# Patient Record
Sex: FEMALE | Race: WHITE | NOT HISPANIC OR LATINO | Employment: FULL TIME | URBAN - METROPOLITAN AREA
[De-identification: names, ages, dates, MRNs, and addresses within clinical notes are randomized per-mention and may not be internally consistent; named-entity substitution may affect disease eponyms.]

---

## 2021-12-16 ENCOUNTER — APPOINTMENT (OUTPATIENT)
Dept: RADIOLOGY | Facility: IMAGING CENTER | Age: 56
End: 2021-12-16
Attending: NURSE PRACTITIONER
Payer: COMMERCIAL

## 2021-12-16 ENCOUNTER — OFFICE VISIT (OUTPATIENT)
Dept: URGENT CARE | Facility: PHYSICIAN GROUP | Age: 56
End: 2021-12-16
Payer: COMMERCIAL

## 2021-12-16 VITALS
SYSTOLIC BLOOD PRESSURE: 124 MMHG | HEART RATE: 77 BPM | DIASTOLIC BLOOD PRESSURE: 82 MMHG | RESPIRATION RATE: 16 BRPM | WEIGHT: 177 LBS | TEMPERATURE: 96.9 F | OXYGEN SATURATION: 97 %
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
## 2021-12-16 DIAGNOSIS — S59.902A INJURY OF LEFT ELBOW, INITIAL ENCOUNTER: ICD-10-CM

## 2021-12-16 DIAGNOSIS — S50.02XA CONTUSION OF LEFT ELBOW, INITIAL ENCOUNTER: ICD-10-CM

## 2021-12-16 PROCEDURE — 73080 X-RAY EXAM OF ELBOW: CPT | Mod: TC,FY,LT | Performed by: RADIOLOGY

## 2021-12-16 PROCEDURE — 99203 OFFICE O/P NEW LOW 30 MIN: CPT | Performed by: NURSE PRACTITIONER

## 2021-12-16 RX ORDER — FENOFIBRATE 150 MG/1
CAPSULE ORAL
COMMUNITY
Start: 2021-11-06

## 2021-12-16 RX ORDER — LISINOPRIL AND HYDROCHLOROTHIAZIDE 12.5; 1 MG/1; MG/1
TABLET ORAL
COMMUNITY
Start: 2021-11-06 | End: 2023-09-11 | Stop reason: SDUPTHER

## 2021-12-16 ASSESSMENT — ENCOUNTER SYMPTOMS
MYALGIAS: 1
TINGLING: 0
SENSORY CHANGE: 0
FOCAL WEAKNESS: 0
CHILLS: 0

## 2021-12-16 NOTE — LETTER
December 16, 2021        Montserrat Harris  [address] [facility] [place] [address]        Montserrat was seen in our clinic today and she is excused from work. Thank you.  If you have any questions or concerns, please don't hesitate to call.        Sincerely,        YRIS Rushing.    Electronically Signed

## 2021-12-16 NOTE — PROGRESS NOTES
Subjective     Montserrat Harris is a 56 y.o. female who presents with Fall (Injured L elbow and forearm, this morning in driveway )            HPI   New problem.  Patient is a 56-year-old female who presents with a fall in her driveway this morning and subsequent left elbow/forearm pain.  She denies any wrist pain or shoulder pain with this.  She denies distal paresthesia.  She is right-hand dominant.  She has been icing but no medications taken so far.  Patient has no known allergies.  Current Outpatient Medications on File Prior to Visit   Medication Sig Dispense Refill   • lisinopril-hydrochlorothiazide (PRINZIDE) 10-12.5 MG per tablet      • metFORMIN (GLUCOPHAGE) 500 MG Tab      • Fenofibrate 150 MG Cap        No current facility-administered medications on file prior to visit.     Social History     Socioeconomic History   • Marital status:      Spouse name: Not on file   • Number of children: Not on file   • Years of education: Not on file   • Highest education level: Not on file   Occupational History   • Not on file   Tobacco Use   • Smoking status: Not on file   • Smokeless tobacco: Not on file   Substance and Sexual Activity   • Alcohol use: Not on file   • Drug use: Not on file   • Sexual activity: Not on file   Other Topics Concern   • Not on file   Social History Narrative   • Not on file     Social Determinants of Health     Financial Resource Strain:    • Difficulty of Paying Living Expenses: Not on file   Food Insecurity:    • Worried About Running Out of Food in the Last Year: Not on file   • Ran Out of Food in the Last Year: Not on file   Transportation Needs:    • Lack of Transportation (Medical): Not on file   • Lack of Transportation (Non-Medical): Not on file   Physical Activity:    • Days of Exercise per Week: Not on file   • Minutes of Exercise per Session: Not on file   Stress:    • Feeling of Stress : Not on file   Social Connections:    • Frequency of Communication with Friends and  Family: Not on file   • Frequency of Social Gatherings with Friends and Family: Not on file   • Attends [employer] Services: Not on file   • Active Member of Clubs or Organizations: Not on file   • Attends Club or Organization Meetings: Not on file   • Marital Status: Not on file   Intimate Partner Violence:    • Fear of Current or Ex-Partner: Not on file   • Emotionally Abused: Not on file   • Physically Abused: Not on file   • Sexually Abused: Not on file   Housing Stability:    • Unable to Pay for Housing in the Last Year: Not on file   • Number of Places Lived in the Last Year: Not on file   • Unstable Housing in the Last Year: Not on file     Breast Cancer-related family history is not on file.      Review of Systems   Constitutional: Negative for chills.   Musculoskeletal: Positive for joint pain and myalgias.   Neurological: Negative for tingling, sensory change and focal weakness.              Objective     /82   Pulse 77   Temp 36.1 °C (96.9 °F) (Temporal)   Resp 16   Wt 80.3 kg (177 lb)   SpO2 97%      Physical Exam  Constitutional:       Appearance: Normal appearance. She is not ill-appearing.   Cardiovascular:      Rate and Rhythm: Normal rate and regular rhythm.      Heart sounds: No murmur heard.      Pulmonary:      Effort: Pulmonary effort is normal.      Breath sounds: Normal breath sounds.   Musculoskeletal:      Comments: Full albeit painful range of motion of left elbow.  No swelling appreciated on exam.  No bruising   Skin:     General: Skin is warm and dry.      Findings: No bruising or erythema.   Neurological:      General: No focal deficit present.      Mental Status: She is alert and oriented to person, place, and time.   Psychiatric:         Mood and Affect: Mood normal.         Behavior: Behavior normal.                             Assessment & Plan        1. Contusion of left elbow, initial encounter     2. Injury of left elbow, initial encounter  DX-ELBOW-COMPLETE 3+ LEFT   3.  Fall due to slipping on ice or snow, initial encounter       Negative imaging on left elbow.  Reviewed icing/nsaids with patient.  Activity as tolerated. Work note for today.  Differential diagnosis, natural history, supportive care, and indications for immediate follow-up discussed at length.

## 2022-11-22 ENCOUNTER — TELEPHONE (OUTPATIENT)
Dept: HEALTH INFORMATION MANAGEMENT | Facility: OTHER | Age: 57
End: 2022-11-22

## 2022-11-22 ENCOUNTER — OFFICE VISIT (OUTPATIENT)
Dept: URGENT CARE | Facility: PHYSICIAN GROUP | Age: 57
End: 2022-11-22
Payer: COMMERCIAL

## 2022-11-22 VITALS
HEIGHT: 63 IN | DIASTOLIC BLOOD PRESSURE: 60 MMHG | SYSTOLIC BLOOD PRESSURE: 112 MMHG | OXYGEN SATURATION: 95 % | HEART RATE: 102 BPM | TEMPERATURE: 98 F | RESPIRATION RATE: 18 BRPM | BODY MASS INDEX: 33.81 KG/M2 | WEIGHT: 190.8 LBS

## 2022-11-22 DIAGNOSIS — J40 BRONCHITIS WITH WHEEZING: ICD-10-CM

## 2022-11-22 PROCEDURE — 99213 OFFICE O/P EST LOW 20 MIN: CPT | Performed by: NURSE PRACTITIONER

## 2022-11-22 RX ORDER — ALBUTEROL SULFATE 90 UG/1
2 AEROSOL, METERED RESPIRATORY (INHALATION) EVERY 6 HOURS PRN
Qty: 8.5 G | Refills: 0 | Status: SHIPPED | OUTPATIENT
Start: 2022-11-22 | End: 2023-05-22 | Stop reason: SDUPTHER

## 2022-11-22 RX ORDER — PREDNISONE 20 MG/1
40 TABLET ORAL DAILY
Qty: 6 TABLET | Refills: 0 | Status: SHIPPED | OUTPATIENT
Start: 2022-11-22 | End: 2022-11-25

## 2022-11-22 NOTE — PROGRESS NOTES
Patient has consented to treatment and for use of patient information for treatment and billing purposes.    Date: 11/22/22     Arrival Mode: Private Vehicle / Ambulatory    Chief Complaint:    Chief Complaint   Patient presents with    Cough     Green and yellow mucous      Patient is deaf and her  is interpreting for her.    History of Present Illness: 57 y.o. female  presents to clinic with 2-day history of cough with posttussive vomiting due to mucus.  Body aches and fatigue.  Presents to clinic with  who has similar symptoms.   states he was tested for influenza and COVID of which both were negative.  Patient did have a coughing fit this morning which did cause her to become quite anxious.   states he did give her some of his inhaler which did help her.  Patient reports wheezing at night.  No history of asthma.  Has used the albuterol inhaler once and a Flonase nasal spray which both were helpful.      ROS:  As stated in HPI     Pertinent Medical History:    No past medical history on file.     Pertinent Surgical History:    No past surgical history on file.     Current  Medications:  Current Outpatient Medications on File Prior to Visit   Medication Sig Dispense Refill    lisinopril-hydrochlorothiazide (PRINZIDE) 10-12.5 MG per tablet       metFORMIN (GLUCOPHAGE) 500 MG Tab       Fenofibrate 150 MG Cap        No current facility-administered medications on file prior to visit.        Allergies:     Patient has no known allergies.     Social History:   Social History     Socioeconomic History    Marital status:      Spouse name: Not on file    Number of children: Not on file    Years of education: Not on file    Highest education level: Not on file   Occupational History    Not on file   Tobacco Use    Smoking status: Not on file    Smokeless tobacco: Not on file   Substance and Sexual Activity    Alcohol use: Not on file    Drug use: Not on file    Sexual activity: Not on  file   Other Topics Concern    Not on file   Social History Narrative    Not on file     Social Determinants of Health     Financial Resource Strain: Not on file   Food Insecurity: Not on file   Transportation Needs: Not on file   Physical Activity: Not on file   Stress: Not on file   Social Connections: Not on file   Intimate Partner Violence: Not on file   Housing Stability: Not on file        No LMP recorded.       Physical Exam:  Vitals:    11/22/22 1013   BP: 112/60   Pulse: (!) 102   Resp: 18   Temp: 36.7 °C (98 °F)   SpO2: 95%        Physical Exam  Vitals reviewed.   Constitutional:       General: She is not in acute distress.     Appearance: Normal appearance. She is well-developed. She is not toxic-appearing.   HENT:      Head: Normocephalic and atraumatic.      Right Ear: Tympanic membrane, ear canal and external ear normal.      Left Ear: Tympanic membrane, ear canal and external ear normal.      Nose: Congestion and rhinorrhea present.      Mouth/Throat:      Lips: Pink.      Mouth: Mucous membranes are moist.      Pharynx: Posterior oropharyngeal erythema present.   Eyes:      General: Lids are normal. Gaze aligned appropriately. No allergic shiner or scleral icterus.     Extraocular Movements: Extraocular movements intact.      Conjunctiva/sclera: Conjunctivae normal.      Pupils: Pupils are equal, round, and reactive to light.   Cardiovascular:      Rate and Rhythm: Normal rate and regular rhythm.      Pulses:           Radial pulses are 2+ on the right side and 2+ on the left side.      Heart sounds: Normal heart sounds.   Pulmonary:      Effort: Pulmonary effort is normal.      Breath sounds: Normal breath sounds. No wheezing.   Musculoskeletal:      Right lower leg: No edema.      Left lower leg: No edema.   Lymphadenopathy:      Cervical: No cervical adenopathy.   Skin:     General: Skin is warm.      Capillary Refill: Capillary refill takes less than 2 seconds.      Coloration: Skin is not  cyanotic or pale.      Findings: No rash.   Neurological:      Mental Status: She is alert.      Gait: Gait is intact.   Psychiatric:         Behavior: Behavior normal. Behavior is cooperative.        Diagnostics:    None     Medical Decision Making:  I personally reviewed prior external notes and test results pertinent to today's visit.   Shared decision-making was utilized with patient did develop treatment plan and clinic course. Catherine, 57 y.o. female 2-day history of viral URI-like symptoms.  Will send for patient's own albuterol inhaler as well as a 3-day burst of prednisone for subjective wheezing.  Did discuss no bacterial foci noted on exam that would indicate the need of antibiotics.  We will provide a 2-day work note.  Patient is requesting a work note for a day a week from now as well as a day in December.  Did inform patient I am unable to excuse her from work in advance.  This did upset the patient encourage patient to follow-up with primary care.    Did advise patient on conservative measures for management of symptoms.  Patient is agreeable to pursue adequate rest, adequate hydration, saltwater gargle and Neti pot for any symptoms of upper respiratory congestion.  Over-the-counter analgesia and antipyretics on a p.r.n. basis as needed for pain and fever.  Did discuss over-the-counter cough medications.      Patient will monitor symptoms closely for worsening and is advised to seek further evaluation the emergency room if alarm signs or symptoms arise.  Patient states understanding and verbalizes agreement with this plan of care.    Plan:    1. Bronchitis with wheezing    - albuterol 108 (90 Base) MCG/ACT Aero Soln inhalation aerosol; Inhale 2 Puffs every 6 hours as needed for Shortness of Breath.  Dispense: 8.5 g; Refill: 0  - predniSONE (DELTASONE) 20 MG Tab; Take 2 Tablets by mouth every day for 3 days.  Dispense: 6 Tablet; Refill: 0  - Referral to establish with [facility] PCP      Disposition:  Patient was discharged in stable condition.    Voice Recognition Disclaimer: Portions of this document were created using voice recognition software. The software does have a chance of producing errors of grammar and possibly content. I have made every reasonable attempt to correct obvious errors, but there may be errors of grammar and possibly content that I did not discover before finalizing the documentation.    Tania Castro, [email or web address].

## 2022-11-22 NOTE — LETTER
November 22, 2022    To Whom It May Concern:         This is confirmation that Montserrat Harris attended her scheduled appointment with GRACE Hester on 11/22/22. Please excuse form work 11/21/22 through 11/22/22.          If you have any questions please do not hesitate to call me at the phone number listed below.    Sincerely,          RIGO Hester.  [phone]

## 2023-01-04 ENCOUNTER — OFFICE VISIT (OUTPATIENT)
Dept: MEDICAL GROUP | Facility: PHYSICIAN GROUP | Age: 58
End: 2023-01-04
Attending: NURSE PRACTITIONER
Payer: COMMERCIAL

## 2023-01-04 ENCOUNTER — HOSPITAL ENCOUNTER (OUTPATIENT)
Dept: RADIOLOGY | Facility: MEDICAL CENTER | Age: 58
End: 2023-01-04
Attending: NURSE PRACTITIONER
Payer: COMMERCIAL

## 2023-01-04 VITALS
TEMPERATURE: 97.2 F | HEIGHT: 63 IN | DIASTOLIC BLOOD PRESSURE: 62 MMHG | SYSTOLIC BLOOD PRESSURE: 110 MMHG | HEART RATE: 67 BPM | OXYGEN SATURATION: 97 % | BODY MASS INDEX: 34.02 KG/M2 | RESPIRATION RATE: 16 BRPM | WEIGHT: 192 LBS

## 2023-01-04 DIAGNOSIS — M25.572 CHRONIC PAIN OF LEFT ANKLE: ICD-10-CM

## 2023-01-04 DIAGNOSIS — I10 PRIMARY HYPERTENSION: ICD-10-CM

## 2023-01-04 DIAGNOSIS — R06.2 WHEEZING: ICD-10-CM

## 2023-01-04 DIAGNOSIS — R23.2 HOT FLASHES: ICD-10-CM

## 2023-01-04 DIAGNOSIS — Z76.89 ESTABLISHING CARE WITH NEW DOCTOR, ENCOUNTER FOR: ICD-10-CM

## 2023-01-04 DIAGNOSIS — Z00.00 ROUTINE HEALTH MAINTENANCE: ICD-10-CM

## 2023-01-04 DIAGNOSIS — E78.5 DYSLIPIDEMIA: ICD-10-CM

## 2023-01-04 DIAGNOSIS — Z11.59 NEED FOR HEPATITIS C SCREENING TEST: ICD-10-CM

## 2023-01-04 DIAGNOSIS — G89.29 CHRONIC PAIN OF LEFT ANKLE: ICD-10-CM

## 2023-01-04 DIAGNOSIS — E66.9 OBESITY (BMI 30-39.9): ICD-10-CM

## 2023-01-04 DIAGNOSIS — H91.3 DEAF, NONSPEAKING: ICD-10-CM

## 2023-01-04 DIAGNOSIS — Z12.31 ENCOUNTER FOR SCREENING MAMMOGRAM FOR BREAST CANCER: ICD-10-CM

## 2023-01-04 DIAGNOSIS — Z23 NEED FOR VACCINATION: ICD-10-CM

## 2023-01-04 DIAGNOSIS — N39.46 MIXED STRESS AND URGE URINARY INCONTINENCE: ICD-10-CM

## 2023-01-04 DIAGNOSIS — R73.03 PRE-DIABETES: ICD-10-CM

## 2023-01-04 PROCEDURE — 90715 TDAP VACCINE 7 YRS/> IM: CPT | Performed by: NURSE PRACTITIONER

## 2023-01-04 PROCEDURE — 90686 IIV4 VACC NO PRSV 0.5 ML IM: CPT | Performed by: NURSE PRACTITIONER

## 2023-01-04 PROCEDURE — 90471 IMMUNIZATION ADMIN: CPT | Performed by: NURSE PRACTITIONER

## 2023-01-04 PROCEDURE — 90746 HEPB VACCINE 3 DOSE ADULT IM: CPT | Performed by: NURSE PRACTITIONER

## 2023-01-04 PROCEDURE — 99214 OFFICE O/P EST MOD 30 MIN: CPT | Mod: 25 | Performed by: NURSE PRACTITIONER

## 2023-01-04 PROCEDURE — 73610 X-RAY EXAM OF ANKLE: CPT | Mod: LT

## 2023-01-04 PROCEDURE — 90472 IMMUNIZATION ADMIN EACH ADD: CPT | Performed by: NURSE PRACTITIONER

## 2023-01-04 RX ORDER — PAROXETINE 10 MG/1
10 TABLET, FILM COATED ORAL
Qty: 90 TABLET | Refills: 0 | Status: SHIPPED | OUTPATIENT
Start: 2023-01-04

## 2023-01-04 ASSESSMENT — PATIENT HEALTH QUESTIONNAIRE - PHQ9: CLINICAL INTERPRETATION OF PHQ2 SCORE: 0

## 2023-01-04 NOTE — ASSESSMENT & PLAN NOTE
New to examiner, chronic for patient.  Patient reports that she will have urge and stress incontinence, goes through many incontinence pads daily.  Is wondering if there is anything that can be done to help.

## 2023-01-04 NOTE — ASSESSMENT & PLAN NOTE
New to examiner, chronic for patient.  Patient reports that she was diagnosed with prediabetes and continues metformin 500 mg 1 time daily.  Due for updated labs.

## 2023-01-04 NOTE — ASSESSMENT & PLAN NOTE
New to examiner, ongoing problem for the patient for the last few months.  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  She has had left Achilles area pain that is not improving.  Denies any injury or trauma to the area.

## 2023-01-04 NOTE — ASSESSMENT & PLAN NOTE
New to examiner, chronic for patient.  Continues lisinopril-hydrochlorothiazide 10-12.5 mg daily, denies side effects of the medication. The patient denies chest pain, shortness of breath, headaches, dizziness, blurry vision, or dyspnea on exertion.  Due for updated labs prior to follow-up.

## 2023-01-04 NOTE — ASSESSMENT & PLAN NOTE
New to examiner, intermittent problem for the patient.  She was prescribed an albuterol inhaler in November for bronchitis with wheezing.  States that she has not used the albuterol since November, but it is very helpful when she needs it.  She has not been diagnosed with asthma.

## 2023-01-04 NOTE — ASSESSMENT & PLAN NOTE
New to examiner, chronic for patient.  Patient is present with her  who is assisting with interpretation with sign language.  Patient's mother had [place] measles while pregnant, patient was born deaf.

## 2023-01-04 NOTE — PROGRESS NOTES
CC:   Chief Complaint   Patient presents with    Establish Care    Hot Flashes    Ankle Pain     HISTORY OF THE PRESENT ILLNESS: Patient is a 57 y.o. female. This pleasant patient is here today to establish care and discuss multiple issues as listed below.    Health Maintenance: Reviewed    Hot flashes  New to examiner, chronic for patient.  Patient's last menstrual period was around 49 years old.  States that she is experiencing bothersome hot flashes all day as well as through the night.  Has never taken medication for this issue.    Pre-diabetes  New to examiner, chronic for patient.  Patient reports that she was diagnosed with prediabetes and continues metformin 500 mg 1 time daily.  Due for updated labs.    Primary hypertension  New to examiner, chronic for patient.  Continues lisinopril-hydrochlorothiazide 10-12.5 mg daily, denies side effects of the medication. The patient denies chest pain, shortness of breath, headaches, dizziness, blurry vision, or dyspnea on exertion.  Due for updated labs prior to follow-up.    Dyslipidemia  New to examiner, chronic for patient.  Continues fenofibrate 150 mg daily, due for updated labs.    Deaf, nonspeaking  New to examiner, chronic for patient.  Patient is present with her  who is assisting with interpretation with sign language.  Patient's mother had Afghan measles while pregnant, patient was born deaf.    Wheezing  New to examiner, intermittent problem for the patient.  She was prescribed an albuterol inhaler in November for bronchitis with wheezing.  States that she has not used the albuterol since November, but it is very helpful when she needs it.  She has not been diagnosed with asthma.    Chronic pain of left ankle  New to examiner, ongoing problem for the patient for the last few months.  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  She has had left Achilles area pain that is not improving.  Denies any injury or trauma  to the area.    Mixed stress and urge urinary incontinence  New to examiner, chronic for patient.  Patient reports that she will have urge and stress incontinence, goes through many incontinence pads daily.  Is wondering if there is anything that can be done to help.    Allergies: Patient has no known allergies.  Current Outpatient Medications Ordered in Epic   Medication Sig Dispense Refill    PARoxetine (PAXIL) 10 MG Tab Take 1 Tablet by mouth at bedtime. 90 Tablet 0    albuterol 108 (90 Base) MCG/ACT Aero Soln inhalation aerosol Inhale 2 Puffs every 6 hours as needed for Shortness of Breath. 8.5 g 0    lisinopril-hydrochlorothiazide (PRINZIDE) 10-12.5 MG per tablet       metFORMIN (GLUCOPHAGE) 500 MG Tab       Fenofibrate 150 MG Cap        No current Epic-ordered facility-administered medications on file.     Past Medical History:   Diagnosis Date    Fibroid     Hypertension     Prediabetes      Past Surgical History:   Procedure Laterality Date    ANKLE ARTHROSCOPY Right     when a child     Social History     Tobacco Use    Smoking status: Never    Smokeless tobacco: Never   Vaping Use    Vaping Use: Never used   Substance Use Topics    Alcohol use: Never    Drug use: Never     Social History     Social History Narrative    Not on file     Family History   Problem Relation Age of Onset    No Known Problems Mother     Cancer Father     Lung Cancer Father         asbestos    Obesity Sister     Heart Failure Brother     No Known Problems Maternal Grandmother     No Known Problems Maternal Grandfather     No Known Problems Paternal Grandmother     No Known Problems Paternal Grandfather     No Known Problems Daughter     No Known Problems Son      ROS:   Constitutional: + Hot flashes.  No fevers, chills, malaise/fatigue.  Eyes: No eye pain.  ENT: No sore throat, congestion.   Resp: No cough, shortness of breath.  CV: No chest pain, leg swelling, palpitations.  GI: No nausea/vomiting, abdominal pain, constipation,  "diarrhea.  : + Urinary incontinence.  No dysuria, hematuria.  MSK: + Left ankle pain.  No weakness.  Skin: No rashes.  Neuro: No dizziness, weakness, headaches.  Psych: No suicidal ideations.    All remaining systems reviewed and found to be negative, except as stated above.        Exam: /62 (BP Location: Left arm, Patient Position: Sitting, BP Cuff Size: Adult)   Pulse 67   Temp 36.2 °C (97.2 °F) (Temporal)   Resp 16   Ht 1.594 m (5' 2.75\")   Wt 87.1 kg (192 lb)   SpO2 97%  Body mass index is 34.28 kg/m².    General: Well nourished, well developed female in NAD, awake and conversant.  Eyes: Normal conjunctiva, anicteric.  Round symmetrical pupils.  ENT: Hearing grossly intact.  No nasal discharge.  Neck: Neck is supple.  No masses or thyromegaly.  CV: No lower extremity edema.  Respiratory: Respirations are nonlabored.  No wheezing.  Abdomen: Non-Distended.  Skin: Warm.  No rashes or ulcers.  MSK: Normal ambulation.  No clubbing or cyanosis.  Neuro: Sensation and CN II-XII grossly normal.  Psych: Alert and oriented.  Cooperative, appropriate mood and affect, normal judgment.      Assessment/Plan:  1. Establishing care with new doctor, encounter for    2. Primary hypertension  New to examiner, chronic for patient.  Continue lisinopril-hydrochlorothiazide 10-12.5 mg daily, does not need a refill at this time.  Due for updated annual labs prior to follow-up.  - CBC WITH DIFFERENTIAL; Future  - Comp Metabolic Panel; Future  - Lipid Profile; Future  - MICROALBUMIN CREAT RATIO URINE; Future  - TSH WITH REFLEX TO FT4; Future    3. Dyslipidemia  New to examiner, chronic for patient.  Continue fenofibrate 150 mg daily, does not need a refill at this time. Due for updated annual labs prior to follow-up.  - HEMOGLOBIN A1C; Future  - Comp Metabolic Panel; Future  - Lipid Profile; Future  - TSH WITH REFLEX TO FT4; Future    4. Obesity (BMI 30-39.9)  Due for updated annual labs prior to follow-up.  - Patient " identified as having weight management issue.  Appropriate orders and counseling given.    5. Pre-diabetes  New to examiner, chronic for patient.  Continue metformin 500 mg once daily, does not need a refill at this time. Due for updated annual labs prior to follow-up.  - HEMOGLOBIN A1C; Future  - Comp Metabolic Panel; Future  - Lipid Profile; Future  - MICROALBUMIN CREAT RATIO URINE; Future    6. Hot flashes  New to examiner, chronic for patient.  Referral to gynecology for evaluation, patient also overdue for Pap smear for cervical cancer screening.  Plan to start paroxetine 10 mg 1 time daily at bedtime.  Follow-up in 1 month to review. Due for updated annual labs prior to follow-up.  - Referral to Gynecology  - CBC WITH DIFFERENTIAL; Future  - Comp Metabolic Panel; Future  - TSH WITH REFLEX TO FT4; Future  - PARoxetine (PAXIL) 10 MG Tab; Take 1 Tablet by mouth at bedtime.  Dispense: 90 Tablet; Refill: 0    7. Mixed stress and urge urinary incontinence  New to examiner, chronic for patient.  Referral to urogynecology for evaluation and treatment. Due for updated annual labs prior to follow-up.  - Referral to Urogynecology  - CBC WITH DIFFERENTIAL; Future  - Comp Metabolic Panel; Future  - MICROALBUMIN CREAT RATIO URINE; Future  - TSH WITH REFLEX TO FT4; Future  - URINALYSIS,CULTURE IF INDICATED; Future    8. Chronic pain of left ankle  New to examiner, ongoing problem for the patient over the last few months without any injury.  Plan to have patient complete left ankle x-ray.  We will follow-up to review results.  - DX-ANKLE 3+ VIEWS LEFT; Future    9. Wheezing  New to examiner, intermittent for patient.  Continue albuterol inhaler 2 puffs every 6 hours as needed for wheezing. Due for updated annual labs prior to follow-up.  - CBC WITH DIFFERENTIAL; Future    10. Deaf, nonspeaking  New to examiner. Patient was born deaf, mother with [place]raini measles while pregnant. Communicates with Sign language.    11. Encounter  for screening mammogram for breast cancer  Due for screening.  - MA-SCREENING MAMMO BILAT W/CAD; Future    12. Routine health maintenance  Due for updated annual labs prior to follow-up. Due for cervical cancer screening.  - Referral to Gynecology  - HEMOGLOBIN A1C; Future  - CBC WITH DIFFERENTIAL; Future  - Comp Metabolic Panel; Future  - Lipid Profile; Future  - HEP C VIRUS ANTIBODY; Future  - MICROALBUMIN CREAT RATIO URINE; Future  - TSH WITH REFLEX TO FT4; Future  - URINALYSIS,CULTURE IF INDICATED; Future    13. Need for hepatitis C screening test  Due for screening.  - HEP C VIRUS ANTIBODY; Future    14. Need for vaccination  Shingrix prescription sent to pharmacy for administration.  Influenza, Tdap, hepatitis B given in clinic.  Due for second hepatitis B dose after 2/1/2023.  - Zoster Vac Recomb Adjuvanted (SHINGRIX) 50 MCG/0.5ML Recon Susp; Inject 0.5 mL into the shoulder, thigh, or buttocks one time for 1 dose.  Dispense: 0.5 mL; Refill: 0  - Tdap Vaccine =>6YO IM  - Hepatitis B Vaccine Adult 20+  - INFLUENZA VACCINE QUAD INJ (PF)     Educated in proper administration of medication(s) ordered today including safety, possible SE, risks, benefits, rationale and alternatives to therapy.   Supportive care, differential diagnoses, and indications for immediate follow-up discussed with patient.    Pathogenesis of diagnosis discussed including typical length and natural progression.    Instructed to return to clinic or nearest emergency department for any change in condition, further concerns, or worsening of symptoms.  Patient states understanding of the plan of care and discharge instructions.    Return in about 1 month (around 2/4/2023) for Preventative Annual, Follow up Labs.    I have placed the below orders and discussed them with an approved delegating provider. The MA is performing the below orders under the direction of Dr. Isabel.     Please note that this dictation was created using voice recognition  software. I have made every reasonable attempt to correct obvious errors, but I expect that there are errors of grammar and possibly content that I did not discover before finalizing the note.

## 2023-01-04 NOTE — ASSESSMENT & PLAN NOTE
New to examiner, chronic for patient.  Patient's last menstrual period was around 49 years old.  States that she is experiencing bothersome hot flashes all day as well as through the night.  Has never taken medication for this issue.

## 2023-01-15 PROBLEM — M76.62 ACHILLES TENDINITIS, LEFT LEG: Status: ACTIVE | Noted: 2023-01-04

## 2023-01-30 ENCOUNTER — HOSPITAL ENCOUNTER (OUTPATIENT)
Dept: RADIOLOGY | Facility: MEDICAL CENTER | Age: 58
End: 2023-01-30
Payer: COMMERCIAL

## 2023-01-31 ENCOUNTER — HOSPITAL ENCOUNTER (OUTPATIENT)
Dept: RADIOLOGY | Facility: MEDICAL CENTER | Age: 58
End: 2023-01-31
Payer: COMMERCIAL

## 2023-02-01 ENCOUNTER — HOSPITAL ENCOUNTER (OUTPATIENT)
Dept: RADIOLOGY | Facility: MEDICAL CENTER | Age: 58
End: 2023-02-01
Attending: NURSE PRACTITIONER
Payer: COMMERCIAL

## 2023-02-01 DIAGNOSIS — Z12.31 ENCOUNTER FOR SCREENING MAMMOGRAM FOR BREAST CANCER: ICD-10-CM

## 2023-02-01 PROCEDURE — 77063 BREAST TOMOSYNTHESIS BI: CPT

## 2023-02-05 SDOH — ECONOMIC STABILITY: HOUSING INSECURITY

## 2023-02-05 SDOH — HEALTH STABILITY: PHYSICAL HEALTH

## 2023-02-05 SDOH — HEALTH STABILITY: MENTAL HEALTH

## 2023-02-05 SDOH — ECONOMIC STABILITY: TRANSPORTATION INSECURITY

## 2023-02-08 ENCOUNTER — OFFICE VISIT (OUTPATIENT)
Dept: MEDICAL GROUP | Facility: PHYSICIAN GROUP | Age: 58
End: 2023-02-08
Payer: COMMERCIAL

## 2023-02-08 VITALS
WEIGHT: 193 LBS | RESPIRATION RATE: 18 BRPM | BODY MASS INDEX: 34.2 KG/M2 | DIASTOLIC BLOOD PRESSURE: 70 MMHG | TEMPERATURE: 97.8 F | HEIGHT: 63 IN | OXYGEN SATURATION: 96 % | SYSTOLIC BLOOD PRESSURE: 118 MMHG | HEART RATE: 74 BPM

## 2023-02-08 DIAGNOSIS — R23.2 HOT FLASHES: ICD-10-CM

## 2023-02-08 DIAGNOSIS — N39.46 MIXED STRESS AND URGE URINARY INCONTINENCE: ICD-10-CM

## 2023-02-08 DIAGNOSIS — Z23 NEED FOR VACCINATION: ICD-10-CM

## 2023-02-08 DIAGNOSIS — Z00.00 ENCOUNTER FOR WELL ADULT EXAM WITHOUT ABNORMAL FINDINGS: ICD-10-CM

## 2023-02-08 DIAGNOSIS — M76.62 ACHILLES TENDINITIS, LEFT LEG: ICD-10-CM

## 2023-02-08 PROCEDURE — 99396 PREV VISIT EST AGE 40-64: CPT | Mod: 25 | Performed by: NURSE PRACTITIONER

## 2023-02-08 PROCEDURE — 90746 HEPB VACCINE 3 DOSE ADULT IM: CPT | Performed by: NURSE PRACTITIONER

## 2023-02-08 PROCEDURE — 90471 IMMUNIZATION ADMIN: CPT | Performed by: NURSE PRACTITIONER

## 2023-02-08 NOTE — ASSESSMENT & PLAN NOTE
Chronic, ongoing.  Patient completed left ankle x-ray that indicated Achilles tendinitis.  Declines referral to orthopedics.

## 2023-02-08 NOTE — ASSESSMENT & PLAN NOTE
Chronic, ongoing. Started paroxetine 10 mg nightly 1 month ago and reports that her hot flashes have improved and she is feeling cooler. Denies any side effects of the medication.

## 2023-02-08 NOTE — PROGRESS NOTES
Subjective:   CC:   Chief Complaint   Patient presents with    Annual Exam     HPI:   Montserrat Harris is a 57 y.o. female who presents for annual exam:    Hot flashes  Chronic, ongoing. Started paroxetine 10 mg nightly 1 month ago and reports that her hot flashes have improved and she is feeling cooler. Denies any side effects of the medication.    Achilles tendinitis, left leg  Chronic, ongoing.  Patient completed left ankle x-ray that indicated Achilles tendinitis.  Declines referral to orthopedics.    Mixed stress and urge urinary incontinence  Chronic, ongoing.  Has been referred to urogynecology, has yet to schedule.     Anticipatory Guidance:  Cholesterol screening: Fasting lipid panel - previously ordered  Diabetes screening: Fasting blood sugar/A1c - previously ordered  Diet: Advised more lean meats, fruits, vegetables, whole grains. Doesn't eat them often.  Exercise: Encourage regular exercise.  Walks a lot at work.  Substance abuse: No   Safe in relationship: Yes  Seatbelts, bike/motorcycle helmet: yes  Sun protection used sometimes  Dentist: up to date  Eye doctor: up to date    Cancer Screening:  Colorectal cancer screening: Cologuard 2022, due 2025  Cervical cancer screening: Due, referred to gynecology.  Patient has GYN provider: Yes has been referred to gynecology.  Breast cancer screenin2023    Infectious Disease Screening/Immunizations:  STI screening: Declines  Practices safe sex: NA  HIV screen: Declines  Immunizations:   Influenza: 2023   Tetanus: 2023   Received HPV series: Aged out    Patient's last menstrual period was 2014 (within months).  She has not utilized hormone replacement therapy.  Reports hot flashes  No significant bloating/fluid retention, pelvic pain, or dyspareunia. No abnormal vaginal discharge.   No breast tenderness, mass, nipple discharge or changes in size or contour.    OB History    Para Term  AB Living   4 2 2 0 2 2   SAB IAB  Ectopic Molar Multiple Live Births   2 0 0 0 0 2     She  reports that she is not currently sexually active and has had partner(s) who are male.  She  has a past medical history of Fibroid, Hypertension, and Prediabetes.  She  has a past surgical history that includes ankle arthroscopy (Right).    Family History   Problem Relation Age of Onset    No Known Problems Mother     Cancer Father     Lung Cancer Father         asbestos    Obesity Sister     Heart Failure Brother     No Known Problems Maternal Grandmother     No Known Problems Maternal Grandfather     No Known Problems Paternal Grandmother     No Known Problems Paternal Grandfather     No Known Problems Daughter     No Known Problems Son      Social History     Tobacco Use    Smoking status: Never    Smokeless tobacco: Never   Vaping Use    Vaping Use: Never used   Substance Use Topics    Alcohol use: Never    Drug use: Never     Patient Active Problem List    Diagnosis Date Noted    Primary hypertension 01/04/2023    Pre-diabetes 01/04/2023    Achilles tendinitis, left leg 01/04/2023    Hot flashes 01/04/2023    Mixed stress and urge urinary incontinence 01/04/2023    Dyslipidemia 01/04/2023    Deaf, nonspeaking 01/04/2023    Wheezing 01/04/2023    Obesity (BMI 30-39.9) 01/04/2023     Current Outpatient Medications   Medication Sig Dispense Refill    PARoxetine (PAXIL) 10 MG Tab Take 1 Tablet by mouth at bedtime. 90 Tablet 0    albuterol 108 (90 Base) MCG/ACT Aero Soln inhalation aerosol Inhale 2 Puffs every 6 hours as needed for Shortness of Breath. 8.5 g 0    lisinopril-hydrochlorothiazide (PRINZIDE) 10-12.5 MG per tablet       metFORMIN (GLUCOPHAGE) 500 MG Tab       Fenofibrate 150 MG Cap        No current facility-administered medications for this visit.     No Known Allergies    Review of Systems   Constitutional: Negative for fever, chills and malaise/fatigue.   HENT: Negative for congestion.    Eyes: Negative for pain.   Respiratory: Negative for  "cough and shortness of breath.    Cardiovascular: Negative for chest pain and leg swelling.   Gastrointestinal: Negative for nausea, vomiting, abdominal pain and diarrhea.   Genitourinary: Negative for dysuria and hematuria.   Skin: Negative for rash.   Neurological: Negative for dizziness, focal weakness and headaches.   Endo/Heme/Allergies: Does not bruise/bleed easily.   Psychiatric/Behavioral: Negative for depression.  The patient is not nervous/anxious.      Objective:   /70   Pulse 74   Temp 36.6 °C (97.8 °F)   Resp 18   Ht 1.594 m (5' 2.75\")   Wt 87.5 kg (193 lb)   LMP 01/01/2014 (Within Months)   SpO2 96%   BMI 34.46 kg/m²     Wt Readings from Last 4 Encounters:   02/08/23 87.5 kg (193 lb)   01/04/23 87.1 kg (192 lb)   11/22/22 86.5 kg (190 lb 12.8 oz)   12/16/21 80.3 kg (177 lb)     Physical Exam:  Constitutional: Well-developed and well-nourished. Not diaphoretic. No distress.   Skin: Skin is warm and dry. No rash noted.  Head: Atraumatic without lesions.  Eyes: Conjunctivae and extraocular motions are normal. Pupils are equal, round, and reactive to light. No scleral icterus.   Ears:  External ears unremarkable. Tympanic membranes clear and intact.  Nose: Nares patent. Septum midline. Turbinates without erythema nor edema. No discharge.   Mouth/Throat: Tongue normal. Oropharynx is clear and moist. Posterior pharynx without erythema or exudates.  Neck: Supple, trachea midline. Normal range of motion. No thyromegaly present. No lymphadenopathy--cervical or supraclavicular.  Cardiovascular: Regular rate and rhythm, S1 and S2 without murmur, rubs, or gallops.    Respiratory: Effort normal. Clear to auscultation throughout. No adventitious sounds.   Breast:  Breast exam deferred. Discussed monthly self exams and what to look for, including peau d'orange or nipple retraction, discharge, breasts moving freely and equally without restriction, axillary/supraclavicular adenopathy, or palpable " masses/nodules.  Abdomen: Soft, non tender, and without distention. Active bowel sounds in all four quadrants. No rebound, guarding.  Extremities: No cyanosis, clubbing, erythema, nor edema. Radial pulses intact and symmetric.   Musculoskeletal: All major joints AROM full in all directions without pain.  Neurological: Alert and oriented x 3. Grossly non-focal. Strength and sensation grossly intact.   Psychiatric:  Behavior, mood, and affect are appropriate.    Assessment and Plan:   1. Encounter for well adult exam without abnormal findings    2. Hot flashes  Chronic, improving.  Continue citalopram 10 mg daily, does not need refill at this time.  Schedule appoint with gynecology and urogynecology.    3. Achilles tendinitis, left leg  Chronic, ongoing.  Patient declines referral to orthopedics.  Recommend over-the-counter ibuprofen or naproxen per packaging instructions as needed for pain.    4. Mixed stress and urge urinary incontinence  Chronic, ongoing.  Patient has been referred to urogynecology, encourage patient to schedule appointment.    5. Need for vaccination  Given today, due for final dose after 7/8/2023.  - Hep B Adult 20+     Health maintenance: Reviewed  Labs per orders  Immunizations: Per orders  Patient counseled about skin care, diet, supplements, and exercise.  Discussed  breast self exam, mammography screening, STD prevention, HIV risk factors and prevention, use and side effects of OCP's, use and side effects of HRT, menopause, osteoporosis, adequate intake of calcium and vitamin D, diet and exercise, colorectal cancer screening.    Follow-up: Return in about 1 year (around 2/8/2024) for Preventative Annual, As needed.     I have placed the below orders and discussed them with an approved delegating provider. The MA is performing the below orders under the direction of Dr. Isabel.      Please note that this dictation was created using voice recognition software. I have worked with consultants  from the vendor as well as technical experts from [facility] to optimize the interface. I have made every reasonable attempt to correct obvious errors, but I expect that there are errors of grammar and possibly content that I did not discover before finalizing the note.

## 2023-05-22 ENCOUNTER — OCCUPATIONAL MEDICINE (OUTPATIENT)
Dept: URGENT CARE | Facility: PHYSICIAN GROUP | Age: 58
End: 2023-05-22
Payer: COMMERCIAL

## 2023-05-22 VITALS
HEART RATE: 80 BPM | HEIGHT: 63 IN | BODY MASS INDEX: 34.91 KG/M2 | WEIGHT: 197 LBS | DIASTOLIC BLOOD PRESSURE: 82 MMHG | TEMPERATURE: 97.2 F | SYSTOLIC BLOOD PRESSURE: 124 MMHG | RESPIRATION RATE: 18 BRPM | OXYGEN SATURATION: 99 %
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
## 2023-05-22 DIAGNOSIS — M54.50 ACUTE BILATERAL LOW BACK PAIN WITHOUT SCIATICA: ICD-10-CM

## 2023-05-22 DIAGNOSIS — T14.8XXA MUSCLE STRAIN: ICD-10-CM

## 2023-05-22 PROCEDURE — 1125F AMNT PAIN NOTED PAIN PRSNT: CPT | Performed by: FAMILY MEDICINE

## 2023-05-22 PROCEDURE — 3074F SYST BP LT 130 MM HG: CPT | Performed by: FAMILY MEDICINE

## 2023-05-22 PROCEDURE — 99213 OFFICE O/P EST LOW 20 MIN: CPT | Performed by: FAMILY MEDICINE

## 2023-05-22 PROCEDURE — 3079F DIAST BP 80-89 MM HG: CPT | Performed by: FAMILY MEDICINE

## 2023-05-22 RX ORDER — ALBUTEROL SULFATE 90 UG/1
2 AEROSOL, METERED RESPIRATORY (INHALATION) EVERY 6 HOURS PRN
Qty: 8.5 G | Refills: 0 | Status: SHIPPED | OUTPATIENT
Start: 2023-05-22 | End: 2023-05-22

## 2023-05-22 ASSESSMENT — PAIN SCALES - GENERAL: PAINLEVEL: 10=SEVERE PAIN

## 2023-05-22 NOTE — LETTER
[facility] [place] [address]  Phone:  [phone] - Fax:  [phone]   [facility] Progress Report and Disability Certification  Date of Service: 5/22/2023   No Show:  No  Date / Time of Next Visit: 5/29/2023   Claim Information   Patient Name: Montserrat Harris  Claim Number:     Employer: [employer]  Date of Injury: 5/16/2023     Insurer / TPA: [employer]  ID / SSN:     Occupation: Packaging  Diagnosis: Diagnoses of Work related injury, Acute bilateral low back pain without sciatica, and Muscle strain were pertinent to this visit.    Medical Information   Related to Industrial Injury?      Subjective Complaints:  DOI 5/16/2023  MARCIA: She was at work moving things and developed sudden back pain. The pain is located in her lower back bilaterally. Pain is constant, it's described as throbbing and spasm like, currently rated 8/10. She has tried Ibuprofen . No loss of bowel or bladder function. No numbness or weakness to lower extremities bilaterally. She denies prior back injury.  No secondary employment.   Objective Findings: No discolorations or deformities noted to inspection of back.  No step-offs or areas of tenderness palpation of spine.  She is tender to palpation of her lumbar paraspinal region bilaterally.  Equal strength and sensation to lower extremities bilaterally.  Normal gait.   Pre-Existing Condition(s):     Assessment:   Initial Visit    Status: Additional Care Required  Permanent Disability:     Plan:      Diagnostics:      Comments:  -Work restrictions include no lifting  -Tylenol, ibuprofen, and heat as needed for symptomatic relief  -Recommend daily stretches    Disability Information   Status: Released to Restricted Duty    From:  5/22/2023  Through: 5/29/2023 Restrictions are: Temporary   Physical Restrictions   Sitting:    Standing:    Stooping:    Bending:      Squatting:    Walking:    Climbing:     Pushing:      Pulling:    Other:    Reaching Above Shoulder (L):   Reaching Above Shoulder (R):       Reaching Below Shoulder (L):    Reaching Below Shoulder (R):      Not to exceed Weight Limits   Carrying(hrs):   Weight Limit(lb):   Lifting(hrs):   Weight  Limit(lb):     Comments: No lifting at all    Repetitive Actions   Hands: i.e. Fine Manipulations from Grasping:     Feet: i.e. Operating Foot Controls:     Driving / Operate Machinery:     Health Care Provider’s Original or Electronic Signature  Diane Velazquez M.D. Health Care Provider’s Original or Electronic Signature    Brent George DO MPH     Clinic Name / Location: [address] [address] Clinic Phone Number: Dept: [phone]   Appointment Time: 10:30 Am Visit Start Time: 11:02 AM   Check-In Time:  10:57 Am Visit Discharge Time: 11:20 Am   Original-Treating Physician or Chiropractor    Page 2-Insurer/TPA    Page 3-Employer    Page 4-Employee

## 2023-05-22 NOTE — LETTER
"EMPLOYEE’S CLAIM FOR COMPENSATION/ REPORT OF INITIAL TREATMENT  FORM C-4  PLEASE TYPE OR PRINT    EMPLOYEE’S CLAIM - PROVIDE ALL INFORMATION REQUESTED   First Name  Montserrat Last Name  Kimberly Birthdate                    1965                Sex  female Claim Number (Insurer’s Use Only)   Home Address  [address] Age  58 y.o. 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 (if applicable)  I got weight hit my hurt back    How did this injury or occupational disease occur? (Be specific and answer in detail. Use additional sheet if necessary)  I hurt my back weight and just got box weight drop package with my daughter   If you believe that you have an occupational disease, when did you first have knowledge of the disability and its relationship to your employment?   Witnesses to the Accident (if applicable)  Rubi Willingham      Nature of Injury or Occupational Disease  Workers' Compensation  Part(s) of Body Injured or Affected  Lower Back Area (Lumbar Area & Lumbo-Sacral), ,     I CERTIFY THAT THE ABOVE IS TRUE AND CORRECT TO T HE BEST OF MY KNOWLEDGE AND " THAT I HAVE PROVIDED THIS INFORMATION IN ORDER TO OBTAIN THE BENEFITS OF [place], OR [place]).  I HEREBY AUTHORIZE ANY PHYSICIAN, CHIROPRACTOR, SURGEON, PRACTITIONER OR ANY OTHER PERSON, ANY HOSPITAL, INCLUDING [facility] OR [facility], ANY  MEDICAL SERVICE ORGANIZATION, ANY INSURANCE COMPANY, OR OTHER INSTITUTION OR ORGANIZATION TO RELEASE TO EACH OTHER, ANY MEDICAL OR OTHER INFORMATION, INCLUDING BENEFITS PAID OR PAYABLE, PERTINENT TO THIS INJURY OR DISEASE, EXCEPT INFORMATION RELATIVE TO DIAGNOSIS, TREATMENT AND/OR COUNSELING FOR AIDS, PSYCHOLOGICAL CONDITIONS, ALCOHOL OR CONTROLLED SUBSTANCES, FOR WHICH I MUST GIVE SPECIFIC AUTHORIZATION.  A PHOTOSTAT OF THIS AUTHORIZATION SHALL BE VALID AS THE ORIGINAL.       Date   Place Employee’s Original or  *Electronic Signature   THIS REPORT MUST BE COMPLETED AND MAILED WITHIN 3 WORKING DAYS OF TREATMENT   Place  [facility]  Name of Facility  [place]   Date  5/22/2023 Diagnosis and Description of Injury or Occupational Disease  (Y99.0) Work related injury  (M54.50) Acute bilateral low back pain without sciatica  (T14.8XXA) Muscle strain Is there evidence that the injured employee was under the influence of alcohol and/or another controlled substance at the time of accident?  ? No ? Yes (if yes, please explain)   Hour  11:02 AM   Diagnoses of Work related injury, Acute bilateral low back pain without sciatica, and Muscle strain were pertinent to this visit. No   Treatment  -Work restrictions include no lifting  -Tylenol, ibuprofen, and heat as needed for symptomatic relief  -Recommend daily stretches  Have you advised the patient to remain off work five days or     more?    X-Ray Findings      ? Yes Indicate dates:   From   To      From information given by the employee, together with medical evidence, can        you directly connect this  "injury or occupational disease as job incurred?  Yes ? No If no, is the injured employee capable of:  ? full duty  No ? modified duty  Yes   Is additional medical care by a physician indicated?  Yes If modified duty, specify any limitations / restrictions  No lifting   Do you know of any previous injury or disease contributing to this condition or occupational disease?  ? Yes ? No (Explain if yes)                          No   Date  5/22/2023 Print Health Care Provider's  Name  Amanda Velazquez M.D. I certify that the employer’s copy of  this form was delivered to the employer on:   Address  [address] Insurer’s Use Only     [facility] Zip  [address]    Provider’s Tax ID Number  [id number] Telephone  Dept: [phone]             Health Care Provider’s Original or Electronic Signature  e-AMANDA Mendez M.D. Degree (MD,DO, DC,PAPilloC,APRN)  PA-C      * Complete and attach Release of Information (Form C-4A) when injured employee signs C-4 Form electronically  ORIGINAL - TREATING HEALTHCARE PROVIDER PAGE 2 - INSURER/TPA PAGE 3 - EMPLOYER PAGE 4 - EMPLOYEE             Form C-4 (rev.08/21)           BRIEF DESCRIPTION OF RIGHTS AND BENEFITS  (Pursuant to [place] 616C.050)    Notice of Injury or Occupational Disease (Incident Report Form C-1): If an injury or occupational disease (OD) arises out of and in the course of employment, you must provide written notice to your employer as soon as practicable, but no later than 7 days after the accident or OD. Your employer shall maintain a sufficient supply of the required forms.    Claim for Compensation (Form C-4): If medical treatment is sought, the form C-4 is available at the place of initial treatment. A completed \"Claim for Compensation\" (Form C-4) must be filed within 90 days after an accident or OD. The treating physician or chiropractor must, within 3 working days after treatment, complete and mail to the employer, the employer's insurer and " third-party , the Claim for Compensation.    Medical Treatment: If you require medical treatment for your on-the-job injury or OD, you may be required to select a physician or chiropractor from a list provided by your workers’ compensation insurer, if it has contracted with an Organization for Managed Care (MCO) or Preferred Provider Organization (PPO) or providers of health care. If your employer has not entered into a contract with an MCO or PPO, you may select a physician or chiropractor from the Panel of Physicians and Chiropractors. Any medical costs related to your industrial injury or OD will be paid by your insurer.    Temporary Total Disability (TTD): If your doctor has certified that you are unable to work for a period of at least 5 consecutive days, or 5 cumulative days in a 20-day period, or places restrictions on you that your employer does not accommodate, you may be entitled to TTD compensation.    Temporary Partial Disability (TPD): If the wage you receive upon reemployment is less than the compensation for TTD to which you are entitled, the insurer may be required to pay you TPD compensation to make up the difference. TPD can only be paid for a maximum of 24 months.    Permanent Partial Disability (PPD): When your medical condition is stable and there is an indication of a PPD as a result of your injury or OD, within 30 days, your insurer must arrange for an evaluation by a rating physician or chiropractor to determine the degree of your PPD. The amount of your PPD award depends on the date of injury, the results of the PPD evaluation, your age and wage.    Permanent Total Disability (PTD): If you are medically certified by a treating physician or chiropractor as permanently and totally disabled and have been granted a PTD status by your insurer, you are entitled to receive monthly benefits not to exceed 66 2/3% of your average monthly wage. The amount of your PTD payments is subject to  reduction if you previously received a lump-sum PPD award.    Vocational Rehabilitation Services: You may be eligible for vocational rehabilitation services if you are unable to return to the job due to a permanent physical impairment or permanent restrictions as a result of your injury or occupational disease.    Transportation and Per Maria Reimbursement: You may be eligible for travel expenses and per maria associated with medical treatment.    Reopening: You may be able to reopen your claim if your condition worsens after claim closure.     Appeal Process: If you disagree with a written determination issued by the insurer or the insurer does not respond to your request, you may appeal to the Department of Administration, , by following the instructions contained in your determination letter. You must appeal the determination within 70 days from the date of the determination letter at [address], [address], [place], [place] [address], or [address], [address], [place] [address]. If you disagree with the  decision, you may appeal to the Department of Administration, . You must file your appeal within 30 days from the date of the  decision letter at [address], [address], [place], [place] [address], or [address], [address], [place], [place] [address]. If you disagree with a decision of an , you may file a petition for judicial review with the District Court. You must do so within 30 days of the Appeal Officer’s decision. You may be represented by an  at your own expense or you may contact the [facility] for possible representation.    [place]  for Injured Workers (NAIW): If you disagree with a  decision, you may request that NAIW represent you without charge at an  Hearing. For information regarding denial of benefits, you may contact the [facility] at: [address], [address], [place] [address], [phone], or [address], [address], [place] [address], [phone]    To File a Complaint with the Division: If you wish to file a complaint with the  of the Division of Industrial Relations (DIR),  please contact the Workers’ Compensation Section, [address], [address], [place], [place] [address], telephone (260) [phone], or [address], [address], [place], [place] [address], telephone [phone]) [phone].    For assistance with Workers’ Compensation Issues: You may contact the [facility] Office for Consumer Health Assistance, [address], [address], [place] [address], Toll Free 1-201.984.5079, Web site: [email or web address]/[email or web address] E-mail: [email or web address]              __________________________________________________________________                                    _________________            Employee Name / Signature                                                                                                                            Date                                                                                                                                                                                                                              D-2 (rev. 10/20)

## 2023-05-29 ENCOUNTER — OCCUPATIONAL MEDICINE (OUTPATIENT)
Dept: URGENT CARE | Facility: PHYSICIAN GROUP | Age: 58
End: 2023-05-29
Payer: COMMERCIAL

## 2023-05-29 VITALS
HEART RATE: 96 BPM | SYSTOLIC BLOOD PRESSURE: 114 MMHG | WEIGHT: 197 LBS | OXYGEN SATURATION: 97 % | RESPIRATION RATE: 14 BRPM | TEMPERATURE: 97.5 F | DIASTOLIC BLOOD PRESSURE: 74 MMHG | BODY MASS INDEX: 34.9 KG/M2

## 2023-05-29 DIAGNOSIS — T14.8XXA MUSCLE STRAIN: ICD-10-CM

## 2023-05-29 DIAGNOSIS — M54.50 ACUTE BILATERAL LOW BACK PAIN WITHOUT SCIATICA: ICD-10-CM

## 2023-05-29 PROCEDURE — 3074F SYST BP LT 130 MM HG: CPT | Performed by: PHYSICIAN ASSISTANT

## 2023-05-29 PROCEDURE — 3078F DIAST BP <80 MM HG: CPT | Performed by: PHYSICIAN ASSISTANT

## 2023-05-29 PROCEDURE — 99213 OFFICE O/P EST LOW 20 MIN: CPT | Performed by: PHYSICIAN ASSISTANT

## 2023-05-29 PROCEDURE — 1126F AMNT PAIN NOTED NONE PRSNT: CPT | Performed by: PHYSICIAN ASSISTANT

## 2023-05-29 ASSESSMENT — PAIN SCALES - GENERAL: PAINLEVEL: NO PAIN

## 2023-05-29 NOTE — PROGRESS NOTES
Subjective:     Montserrat Harris is a 58 y.o. female who presents for Follow-Up (Needs to be closed due to an accidents)      Date of injury 5/16/2023: Patient presents for second follow-up visit with her daughter acting as ASL .  Apparently was a miscommunication and the patient was supposed to have her primary provide a work accommodation letter or restrictions and due to this miscommunication patient ended up presenting to urgent care reporting an injury.  The injury was actually short-lived and she felt much better the next day and currently her back pain is absent.  They would like to have the Worker's Comp. claim terminated and allow for the work restrictions to kick in from primary    PMH:   No pertinent past medical history to this problem  MEDS:  Medications were reviewed in EMR  ALLERGIES:  Allergies were reviewed in EMR  SOCHX:  Works as a packaging/handler  FH:   No pertinent family history to this problem       Objective:     /74   Pulse 96   Temp 36.4 °C (97.5 °F) (Temporal)   Resp 14   Wt 89.4 kg (197 lb)   LMP 01/01/2014 (Within Months)   SpO2 97%   BMI 34.90 kg/m²     Alert nontoxic female no acute distress.  Nontender palpation over the bilateral lumbar paraspinal region.  No midline step-off crepitance or deformity.  5-5 strength bilateral lower extremities.  Ambulatory with a steady station and gait    Assessment/Plan:       1. Acute bilateral low back pain without sciatica    2. Muscle strain    Released to Full Duty FROM   TO            Differential diagnosis, natural history, supportive care, and indications for immediate follow-up discussed.

## 2023-05-29 NOTE — LETTER
[facility] [address] [place] [address]  Phone:  [phone] - Fax:  [phone]   [facility] Progress Report and Disability Certification  Date of Service: 5/29/2023   No Show:  No  Date / Time of Next Visit:     Claim Information   Patient Name: Montserrat Harris  Claim Number:     Employer: [employer]  Date of Injury: 5/16/2023     Insurer / TPA: [employer]  ID / SSN:     Occupation: Packaging  Diagnosis: Diagnoses of Acute bilateral low back pain without sciatica and Muscle strain were pertinent to this visit.    Medical Information   Related to Industrial Injury? Yes    Subjective Complaints:  Date of injury 5/16/2023: Patient presents for second follow-up visit with her daughter acting as ASL .  Apparently was a miscommunication and the patient was supposed to have her primary provide a work accommodation letter or restrictions and due to this miscommunication patient ended up presenting to urgent care reporting an injury.  The injury was actually short-lived and she felt much better the next day and currently her back pain is absent.  They would like to have the Worker's Comp. claim terminated and allow for the work restrictions to kick in from primary   Objective Findings: Alert nontoxic female no acute distress.  Nontender palpation over the bilateral lumbar paraspinal region.  No midline step-off crepitance or deformity.  5-5 strength bilateral lower extremities.  Ambulatory with a steady station and gait   Pre-Existing Condition(s):     Assessment:   Condition Improved    Status: Discharged / Care Transfer  Permanent Disability:No    Plan:      Diagnostics:      Comments:       Disability Information   Status: Released to Full Duty    From:     Through:   Restrictions are:     Physical Restrictions   Sitting:    Standing:    Stooping:    Bending:      Squatting:    Walking:    Climbing:    Pushing:      Pulling:     Other:    Reaching Above Shoulder (L):   Reaching Above Shoulder (R):       Reaching Below Shoulder (L):    Reaching Below Shoulder (R):      Not to exceed Weight Limits   Carrying(hrs):   Weight Limit(lb):   Lifting(hrs):   Weight  Limit(lb):     Comments:      Repetitive Actions   Hands: i.e. Fine Manipulations from Grasping:     Feet: i.e. Operating Foot Controls:     Driving / Operate Machinery:     Health Care Provider’s Original or Electronic Signature  SHON GoldenAMichelle-C. Health Care Provider’s Original or Electronic Signature    Brent George DO MPH     Clinic Name / Location: [address] [address] Clinic Phone Number: Dept: [phone]   Appointment Time: 11:00 Am Visit Start Time: 11:23 AM   Check-In Time:  10:46 Am Visit Discharge Time:  11:56 AM   Original-Treating Physician or Chiropractor    Page 2-Insurer/TPA    Page 3-Employer    Page 4-Employee

## 2023-05-29 NOTE — LETTER
May 29, 2023    To Whom It May Concern:         This is confirmation that Montserrat Harris attended her scheduled appointment with Casey Shirley P.A.-C. on 5/29/23.  I evaluated this patient and will terminate her Worker's Comp. claim.  I understand that her primary is in the process of providing work accommodations and I would encourage that the patient remain at a light duty workstation until this paperwork is clarified and further recommendation from her primary is can provide guidance.         If you have any questions please do not hesitate to call me at the phone number listed below.    Sincerely,  Casey Shirley P.A.-C.  [phone]

## 2023-07-05 ENCOUNTER — OFFICE VISIT (OUTPATIENT)
Dept: URGENT CARE | Facility: PHYSICIAN GROUP | Age: 58
End: 2023-07-05
Payer: COMMERCIAL

## 2023-07-05 VITALS
DIASTOLIC BLOOD PRESSURE: 70 MMHG | BODY MASS INDEX: 34.73 KG/M2 | WEIGHT: 196 LBS | RESPIRATION RATE: 14 BRPM | TEMPERATURE: 97.4 F | HEIGHT: 63 IN | OXYGEN SATURATION: 96 % | HEART RATE: 101 BPM | SYSTOLIC BLOOD PRESSURE: 110 MMHG

## 2023-07-05 DIAGNOSIS — J32.9 RHINOSINUSITIS: ICD-10-CM

## 2023-07-05 DIAGNOSIS — J45.21 MILD INTERMITTENT ASTHMA WITH EXACERBATION: ICD-10-CM

## 2023-07-05 DIAGNOSIS — R05.1 ACUTE COUGH: ICD-10-CM

## 2023-07-05 PROCEDURE — 3078F DIAST BP <80 MM HG: CPT | Performed by: FAMILY MEDICINE

## 2023-07-05 PROCEDURE — 99214 OFFICE O/P EST MOD 30 MIN: CPT | Performed by: FAMILY MEDICINE

## 2023-07-05 PROCEDURE — 3074F SYST BP LT 130 MM HG: CPT | Performed by: FAMILY MEDICINE

## 2023-07-05 RX ORDER — PREDNISONE 20 MG/1
40 TABLET ORAL DAILY
Qty: 10 TABLET | Refills: 0 | Status: SHIPPED | OUTPATIENT
Start: 2023-07-05 | End: 2023-07-10

## 2023-07-05 RX ORDER — ALBUTEROL SULFATE 90 UG/1
2 AEROSOL, METERED RESPIRATORY (INHALATION) EVERY 4 HOURS PRN
Qty: 1 EACH | Refills: 0 | Status: SHIPPED | OUTPATIENT
Start: 2023-07-05

## 2023-07-05 RX ORDER — DEXTROMETHORPHAN HYDROBROMIDE AND PROMETHAZINE HYDROCHLORIDE 15; 6.25 MG/5ML; MG/5ML
5 SYRUP ORAL 4 TIMES DAILY PRN
Qty: 120 ML | Refills: 0 | Status: SHIPPED | OUTPATIENT
Start: 2023-07-05 | End: 2024-01-02

## 2023-07-05 RX ORDER — DOXYCYCLINE HYCLATE 100 MG
100 TABLET ORAL 2 TIMES DAILY
Qty: 14 TABLET | Refills: 0 | Status: SHIPPED | OUTPATIENT
Start: 2023-07-05 | End: 2023-07-12

## 2023-07-05 ASSESSMENT — ENCOUNTER SYMPTOMS
NAUSEA: 0
EYE REDNESS: 0
WEIGHT LOSS: 0
MYALGIAS: 0
VOMITING: 0
EYE DISCHARGE: 0

## 2023-07-05 NOTE — LETTER
July 5, 2023         Patient: Montserrat Harris   YOB: 1965   Date of Visit: 7/5/2023           To Whom it May Concern:    Montserrat Harris was seen in my clinic on 7/5/2023. Please excuse work absences. She may return to full duty 7/11/2023.       Sincerely,           Kris Barrios M.D.  Electronically Signed

## 2023-07-05 NOTE — PROGRESS NOTES
"Subjective     Montserrat Harris is a 58 y.o. female who presents with Cough ([id number]), Emesis, and Fever            Montserrat is deaf and nonverbal.  We used a pen/notepad for communication.  1 month productive cough without blood in sputum.  Shortness of breath and wheezing.  PMH asthma using albuterol approximately every 4 hours.  Associated nasal congestion and drainage.  She notes fever x1.  No other aggravating alleviating factors.        Review of Systems   Constitutional:  Negative for malaise/fatigue and weight loss.   Eyes:  Negative for discharge and redness.   Gastrointestinal:  Negative for nausea and vomiting.   Musculoskeletal:  Negative for joint pain and myalgias.   Skin:  Negative for itching and rash.              Objective     /70   Pulse (!) 101   Temp 36.3 °C (97.4 °F) (Temporal)   Resp 14   Ht 1.6 m (5' 3\")   Wt 88.9 kg (196 lb)   LMP 01/01/2014 (Within Months)   SpO2 96%   BMI 34.72 kg/m²      Physical Exam  Constitutional:       General: She is not in acute distress.     Appearance: She is well-developed.   HENT:      Head: Normocephalic and atraumatic.      Right Ear: Tympanic membrane normal.      Left Ear: Tympanic membrane normal.      Nose: Congestion present.      Comments: Purulent PND     Mouth/Throat:      Mouth: Mucous membranes are moist.      Pharynx: Posterior oropharyngeal erythema present.   Eyes:      Conjunctiva/sclera: Conjunctivae normal.   Cardiovascular:      Rate and Rhythm: Normal rate and regular rhythm.      Heart sounds: Normal heart sounds. No murmur heard.  Pulmonary:      Effort: Pulmonary effort is normal.      Breath sounds: Wheezing present.   Musculoskeletal:      Cervical back: Neck supple.   Lymphadenopathy:      Cervical: No cervical adenopathy.   Skin:     General: Skin is warm and dry.      Findings: No rash.   Neurological:      Mental Status: She is alert.                             Assessment & Plan        1. Rhinosinusitis    - " doxycycline (VIBRAMYCIN) 100 MG Tab; Take 1 Tablet by mouth 2 times a day for 7 days.  Dispense: 14 Tablet; Refill: 0    2. Acute cough    - promethazine-dextromethorphan (PROMETHAZINE-DM) 6.25-15 MG/5ML syrup; Take 5 mL by mouth 4 times a day as needed for Cough.  Dispense: 120 mL; Refill: 0    3. Mild intermittent asthma with exacerbation    - predniSONE (DELTASONE) 20 MG Tab; Take 2 Tablets by mouth every day for 5 days.  Dispense: 10 Tablet; Refill: 0  - albuterol 108 (90 Base) MCG/ACT Aero Soln inhalation aerosol; Inhale 2 Puffs every four hours as needed for Shortness of Breath.  Dispense: 1 Each; Refill: 0     Differential diagnosis, natural history, supportive care, and indications for immediate follow-up were discussed.

## 2023-07-24 ENCOUNTER — NON-PROVIDER VISIT (OUTPATIENT)
Dept: MEDICAL GROUP | Facility: PHYSICIAN GROUP | Age: 58
End: 2023-07-24
Payer: COMMERCIAL

## 2023-07-24 DIAGNOSIS — Z23 NEED FOR VACCINATION: ICD-10-CM

## 2023-07-24 PROCEDURE — 90746 HEPB VACCINE 3 DOSE ADULT IM: CPT | Performed by: NURSE PRACTITIONER

## 2023-07-24 PROCEDURE — 90471 IMMUNIZATION ADMIN: CPT | Performed by: NURSE PRACTITIONER

## 2023-07-24 NOTE — PROGRESS NOTES
"Montserrat Harris is a 58 y.o. female here for a non-provider visit for:   HEPATITIS B 3 of 3    Reason for immunization: continue or complete series started at the office  Immunization records indicate need for vaccine: Yes, confirmed with Epic  Minimum interval has been met for this vaccine: Yes  ABN completed: Yes    VIS Dated  05/12/2023 was given to patient: Yes  All IAC Questionnaire questions were answered \"No.\"    Patient tolerated injection and no adverse effects were observed or reported: Yes    Pt scheduled for next dose in series: Not Indicated    "

## 2023-09-11 RX ORDER — LISINOPRIL AND HYDROCHLOROTHIAZIDE 12.5; 1 MG/1; MG/1
1 TABLET ORAL DAILY
Qty: 90 TABLET | Refills: 1 | Status: SHIPPED | OUTPATIENT
Start: 2023-09-11

## 2023-09-11 NOTE — TELEPHONE ENCOUNTER
Requested Prescriptions     Pending Prescriptions Disp Refills   • lisinopril-hydrochlorothiazide (PRINZIDE) 10-12.5 MG per tablet 90 Tablet 1     Sig: Take 1 Tablet by mouth every day.       RIGO Durham.

## 2024-01-02 ENCOUNTER — OFFICE VISIT (OUTPATIENT)
Dept: MEDICAL GROUP | Facility: PHYSICIAN GROUP | Age: 59
End: 2024-01-02
Payer: COMMERCIAL

## 2024-01-02 VITALS
WEIGHT: 195 LBS | TEMPERATURE: 96.9 F | RESPIRATION RATE: 18 BRPM | HEART RATE: 95 BPM | DIASTOLIC BLOOD PRESSURE: 68 MMHG | BODY MASS INDEX: 35.88 KG/M2 | SYSTOLIC BLOOD PRESSURE: 104 MMHG | OXYGEN SATURATION: 95 % | HEIGHT: 62 IN

## 2024-01-02 DIAGNOSIS — I10 PRIMARY HYPERTENSION: ICD-10-CM

## 2024-01-02 DIAGNOSIS — R73.03 PRE-DIABETES: ICD-10-CM

## 2024-01-02 DIAGNOSIS — E66.9 OBESITY (BMI 30-39.9): ICD-10-CM

## 2024-01-02 DIAGNOSIS — E78.5 DYSLIPIDEMIA: ICD-10-CM

## 2024-01-02 DIAGNOSIS — H91.3 DEAF, NONSPEAKING: ICD-10-CM

## 2024-01-02 DIAGNOSIS — Z23 NEED FOR VACCINATION: ICD-10-CM

## 2024-01-02 DIAGNOSIS — Z11.4 SCREENING FOR HIV WITHOUT PRESENCE OF RISK FACTORS: ICD-10-CM

## 2024-01-02 DIAGNOSIS — Z11.59 NEED FOR HEPATITIS C SCREENING TEST: ICD-10-CM

## 2024-01-02 DIAGNOSIS — Z00.00 ROUTINE HEALTH MAINTENANCE: ICD-10-CM

## 2024-01-02 DIAGNOSIS — Z02.89 ENCOUNTER FOR COMPLETION OF FORM WITH PATIENT: ICD-10-CM

## 2024-01-02 PROCEDURE — 3078F DIAST BP <80 MM HG: CPT | Performed by: NURSE PRACTITIONER

## 2024-01-02 PROCEDURE — 90686 IIV4 VACC NO PRSV 0.5 ML IM: CPT | Performed by: NURSE PRACTITIONER

## 2024-01-02 PROCEDURE — 90471 IMMUNIZATION ADMIN: CPT | Performed by: NURSE PRACTITIONER

## 2024-01-02 PROCEDURE — 99214 OFFICE O/P EST MOD 30 MIN: CPT | Mod: 25 | Performed by: NURSE PRACTITIONER

## 2024-01-02 PROCEDURE — 3074F SYST BP LT 130 MM HG: CPT | Performed by: NURSE PRACTITIONER

## 2024-01-02 ASSESSMENT — PATIENT HEALTH QUESTIONNAIRE - PHQ9: CLINICAL INTERPRETATION OF PHQ2 SCORE: 0

## 2024-01-02 NOTE — LETTER
January 2, 2024         Patient: Montserrat Harris   YOB: 1965   Date of Visit: 1/2/2024           To Whom it May Concern:    Montserrat Harris was seen in my clinic on 1/2/2024. Due to her deafness, she may not work around the pit.    If you have any questions or concerns, please don't hesitate to call.        Sincerely,           RGIO Durham.  Electronically Signed

## 2024-01-02 NOTE — ASSESSMENT & PLAN NOTE
Chronic, ongoing. 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 She is currently working at [employer].  Requesting a note and paperwork to be completed for accommodations at work due to inability to hear.

## 2024-01-03 NOTE — PROGRESS NOTES
CC: Diagnoses of Encounter for completion of form with patient, Deaf, nonspeaking, Dyslipidemia, Obesity (BMI 30-39.9), Pre-diabetes, Primary hypertension, Routine health maintenance, Need for hepatitis C screening test, Screening for HIV without presence of risk factors, and Need for vaccination were pertinent to this visit.                                                                                                                                       HPI:   Montserrat presents today with the following concerns:    Deaf, nonspeaking  Chronic, ongoing. The patient was born deaf due to her mother being ill with [place] Measles while pregnant with her. She is currently working at [employer].  Requesting a note and paperwork to be completed for accommodations at work due to inability to hear.    Patient Active Problem List    Diagnosis Date Noted    Primary hypertension 01/04/2023    Pre-diabetes 01/04/2023    Achilles tendinitis, left leg 01/04/2023    Hot flashes 01/04/2023    Mixed stress and urge urinary incontinence 01/04/2023    Dyslipidemia 01/04/2023    Deaf, nonspeaking 01/04/2023    Wheezing 01/04/2023    Obesity (BMI 30-39.9) 01/04/2023     Current Outpatient Medications   Medication Sig Dispense Refill    lisinopril-hydrochlorothiazide (PRINZIDE) 10-12.5 MG per tablet Take 1 Tablet by mouth every day. 90 Tablet 1    albuterol 108 (90 Base) MCG/ACT Aero Soln inhalation aerosol Inhale 2 Puffs every four hours as needed for Shortness of Breath. 1 Each 0    PARoxetine (PAXIL) 10 MG Tab Take 1 Tablet by mouth at bedtime. 90 Tablet 0    metFORMIN (GLUCOPHAGE) 500 MG Tab       Fenofibrate 150 MG Cap        No current facility-administered medications for this visit.     Allergies as of 01/02/2024    (No Known Allergies)      ROS:  Constitutional: No fevers, chills, malaise/fatigue.  Eyes: No eye pain.  ENT: Deaf.  No sore throat, congestion.   Resp: No cough, shortness of breath.  CV: No chest pain, leg  "swelling, palpitations.  GI: No nausea/vomiting, abdominal pain, constipation, diarrhea.  : No dysuria, hematuria.  MSK: No weakness.  Skin: No rashes.  Neuro: No dizziness, weakness, headaches.  Psych: No suicidal ideations.    All remaining systems reviewed and found to be negative, except as stated above.      /68 (BP Location: Left arm, Patient Position: Sitting, BP Cuff Size: Large adult)   Pulse 95   Temp 36.1 °C (96.9 °F) (Temporal)   Resp 18   Ht 1.581 m (5' 2.25\")   Wt 88.5 kg (195 lb)   LMP 01/01/2014 (Within Months)   SpO2 95%   BMI 35.38 kg/m²     Physical Exam:  General: Well nourished, well developed female in NAD, awake and conversant.  Eyes: Normal conjunctiva, anicteric.  Round symmetrical pupils.  ENT: Deaf.  No nasal discharge.  Neck: Neck is supple.  No masses or thyromegaly.  CV: No lower extremity edema.  Respiratory: Respirations are nonlabored.  No wheezing.  Abdomen: Non-Distended.  Skin: Warm.  No rashes or ulcers.  MSK: Normal ambulation.  No clubbing or cyanosis.  Neuro: Sensation and CN II-XII grossly normal.  Psych: Alert and oriented.  Cooperative, appropriate mood and affect, normal judgment.      Assessment and Plan.   58 y.o. female with the following issues:  1. Encounter for completion of form with patient  2. Deaf, nonspeaking  Chronic, ongoing.  Work accommodation and letter provided, accommodation paperwork scanned into chart, original was provided to patient.    3. Dyslipidemia  Due for updated annual labs prior to follow-up in February 2024.  - Comp Metabolic Panel; Future  - Lipid Profile; Future  - TSH WITH REFLEX TO FT4; Future    4. Obesity (BMI 30-39.9)  Due for updated annual labs prior to follow-up in February 2024.  - CBC WITH DIFFERENTIAL; Future  - Comp Metabolic Panel; Future  - Lipid Profile; Future  - TSH WITH REFLEX TO FT4; Future  - HEMOGLOBIN A1C; Future    5. Pre-diabetes  Due for updated annual labs prior to follow-up in February 2024.  - " Comp Metabolic Panel; Future  - Lipid Profile; Future  - HEMOGLOBIN A1C; Future  - MICROALBUMIN CREAT RATIO URINE; Future    6. Primary hypertension  Due for updated annual labs prior to follow-up in February 2024.  - CBC WITH DIFFERENTIAL; Future  - Comp Metabolic Panel; Future  - TSH WITH REFLEX TO FT4; Future  - MICROALBUMIN CREAT RATIO URINE; Future    7. Routine health maintenance  Due for updated annual labs prior to follow-up in February 2024.  - CBC WITH DIFFERENTIAL; Future  - Comp Metabolic Panel; Future  - HEP C VIRUS ANTIBODY; Future  - HIV AG/AB COMBO ASSAY SCREENING; Future  - Lipid Profile; Future  - TSH WITH REFLEX TO FT4; Future  - HEMOGLOBIN A1C; Future  - MICROALBUMIN CREAT RATIO URINE; Future    8. Need for hepatitis C screening test  Due for one-time screening.  - HEP C VIRUS ANTIBODY; Future    9. Screening for HIV without presence of risk factors  Due for one-time screening.  - HIV AG/AB COMBO ASSAY SCREENING; Future    10. Need for vaccination  Given today.  - INFLUENZA VACCINE QUAD INJ (PF)     I have placed the below orders and discussed them with an approved delegating provider. The MA is performing the below orders under the direction of Dr. Isabel.      Return in about 6 weeks (around 2/14/2024) for Preventative Annual.     Please note that this dictation was created using voice recognition software. I have worked with consultants from the vendor as well as technical experts from [employer] [facility] to optimize the interface. I have made every reasonable attempt to correct obvious errors, but I expect that there are errors of grammar and possibly content that I did not discover before finalizing the note.

## 2024-02-11 SDOH — ECONOMIC STABILITY: HOUSING INSECURITY
IN THE LAST 12 MONTHS, WAS THERE A TIME WHEN YOU DID NOT HAVE A STEADY PLACE TO SLEEP OR SLEPT IN A SHELTER (INCLUDING NOW)?: NO

## 2024-02-11 SDOH — ECONOMIC STABILITY: TRANSPORTATION INSECURITY
IN THE PAST 12 MONTHS, HAS LACK OF TRANSPORTATION KEPT YOU FROM MEETINGS, WORK, OR FROM GETTING THINGS NEEDED FOR DAILY LIVING?: NO

## 2024-02-11 SDOH — ECONOMIC STABILITY: INCOME INSECURITY: IN THE LAST 12 MONTHS, WAS THERE A TIME WHEN YOU WERE NOT ABLE TO PAY THE MORTGAGE OR RENT ON TIME?: NO

## 2024-02-11 SDOH — ECONOMIC STABILITY: TRANSPORTATION INSECURITY
IN THE PAST 12 MONTHS, HAS THE LACK OF TRANSPORTATION KEPT YOU FROM MEDICAL APPOINTMENTS OR FROM GETTING MEDICATIONS?: YES

## 2024-02-11 SDOH — ECONOMIC STABILITY: FOOD INSECURITY: WITHIN THE PAST 12 MONTHS, THE FOOD YOU BOUGHT JUST DIDN'T LAST AND YOU DIDN'T HAVE MONEY TO GET MORE.: NEVER TRUE

## 2024-02-11 SDOH — ECONOMIC STABILITY: FOOD INSECURITY: WITHIN THE PAST 12 MONTHS, YOU WORRIED THAT YOUR FOOD WOULD RUN OUT BEFORE YOU GOT MONEY TO BUY MORE.: NEVER TRUE

## 2024-02-11 SDOH — HEALTH STABILITY: PHYSICAL HEALTH: ON AVERAGE, HOW MANY MINUTES DO YOU ENGAGE IN EXERCISE AT THIS LEVEL?: 40 MIN

## 2024-02-11 SDOH — ECONOMIC STABILITY: HOUSING INSECURITY: IN THE LAST 12 MONTHS, HOW MANY PLACES HAVE YOU LIVED?: 0

## 2024-02-11 SDOH — ECONOMIC STABILITY: INCOME INSECURITY: HOW HARD IS IT FOR YOU TO PAY FOR THE VERY BASICS LIKE FOOD, HOUSING, MEDICAL CARE, AND HEATING?: NOT HARD AT ALL

## 2024-02-11 SDOH — HEALTH STABILITY: MENTAL HEALTH
STRESS IS WHEN SOMEONE FEELS TENSE, NERVOUS, ANXIOUS, OR CAN'T SLEEP AT NIGHT BECAUSE THEIR MIND IS TROUBLED. HOW STRESSED ARE YOU?: VERY MUCH

## 2024-02-11 SDOH — ECONOMIC STABILITY: TRANSPORTATION INSECURITY
IN THE PAST 12 MONTHS, HAS LACK OF RELIABLE TRANSPORTATION KEPT YOU FROM MEDICAL APPOINTMENTS, MEETINGS, WORK OR FROM GETTING THINGS NEEDED FOR DAILY LIVING?: NO

## 2024-02-11 SDOH — ECONOMIC STABILITY: HOUSING INSECURITY
IN THE LAST 12 MONTHS, WAS THERE A TIME WHEN YOU DID NOT HAVE A STEADY PLACE TO SLEEP OR SLEPT IN A SHELTER (INCLUDING NOW)?: PATIENT REFUSED

## 2024-02-11 SDOH — HEALTH STABILITY: PHYSICAL HEALTH: ON AVERAGE, HOW MANY DAYS PER WEEK DO YOU ENGAGE IN MODERATE TO STRENUOUS EXERCISE (LIKE A BRISK WALK)?: 7 DAYS

## 2024-02-11 ASSESSMENT — LIFESTYLE VARIABLES
AUDIT-C TOTAL SCORE: 4
SKIP TO QUESTIONS 9-10: 0
HOW MANY STANDARD DRINKS CONTAINING ALCOHOL DO YOU HAVE ON A TYPICAL DAY: PATIENT DOES NOT DRINK
HOW OFTEN DO YOU HAVE SIX OR MORE DRINKS ON ONE OCCASION: DAILY OR ALMOST DAILY
HOW OFTEN DO YOU HAVE A DRINK CONTAINING ALCOHOL: NEVER
HOW OFTEN DO YOU HAVE SIX OR MORE DRINKS ON ONE OCCASION: DAILY OR ALMOST DAILY
HOW MANY STANDARD DRINKS CONTAINING ALCOHOL DO YOU HAVE ON A TYPICAL DAY: PATIENT DOES NOT DRINK
SKIP TO QUESTIONS 9-10: 0
HOW OFTEN DO YOU HAVE A DRINK CONTAINING ALCOHOL: NEVER
AUDIT-C TOTAL SCORE: 4

## 2024-02-11 ASSESSMENT — SOCIAL DETERMINANTS OF HEALTH (SDOH)
HOW OFTEN DO YOU ATTEND CHURCH OR RELIGIOUS SERVICES?: NEVER
HOW OFTEN DO YOU ATTEND CHURCH OR RELIGIOUS SERVICES?: NEVER
HOW MANY DRINKS CONTAINING ALCOHOL DO YOU HAVE ON A TYPICAL DAY WHEN YOU ARE DRINKING: PATIENT DOES NOT DRINK
HOW OFTEN DO YOU GET TOGETHER WITH FRIENDS OR RELATIVES?: NEVER
IN A TYPICAL WEEK, HOW MANY TIMES DO YOU TALK ON THE PHONE WITH FAMILY, FRIENDS, OR NEIGHBORS?: ONCE A WEEK
HOW OFTEN DO YOU ATTENT MEETINGS OF THE CLUB OR ORGANIZATION YOU BELONG TO?: NEVER
HOW OFTEN DO YOU ATTENT MEETINGS OF THE CLUB OR ORGANIZATION YOU BELONG TO?: NEVER
HOW OFTEN DO YOU HAVE SIX OR MORE DRINKS ON ONE OCCASION: DAILY OR ALMOST DAILY
HOW OFTEN DO YOU HAVE A DRINK CONTAINING ALCOHOL: NEVER
IN A TYPICAL WEEK, HOW MANY TIMES DO YOU TALK ON THE PHONE WITH FAMILY, FRIENDS, OR NEIGHBORS?: ONCE A WEEK
HOW OFTEN DO YOU GET TOGETHER WITH FRIENDS OR RELATIVES?: NEVER
IN A TYPICAL WEEK, HOW MANY TIMES DO YOU TALK ON THE PHONE WITH FAMILY, FRIENDS, OR NEIGHBORS?: ONCE A WEEK
DO YOU BELONG TO ANY CLUBS OR ORGANIZATIONS SUCH AS CHURCH GROUPS UNIONS, FRATERNAL OR ATHLETIC GROUPS, OR SCHOOL GROUPS?: NO
HOW HARD IS IT FOR YOU TO PAY FOR THE VERY BASICS LIKE FOOD, HOUSING, MEDICAL CARE, AND HEATING?: NOT HARD AT ALL
WITHIN THE PAST 12 MONTHS, YOU WORRIED THAT YOUR FOOD WOULD RUN OUT BEFORE YOU GOT THE MONEY TO BUY MORE: NEVER TRUE
HOW OFTEN DO YOU ATTENT MEETINGS OF THE CLUB OR ORGANIZATION YOU BELONG TO?: NEVER
HOW OFTEN DO YOU ATTEND CHURCH OR RELIGIOUS SERVICES?: NEVER
DO YOU BELONG TO ANY CLUBS OR ORGANIZATIONS SUCH AS CHURCH GROUPS UNIONS, FRATERNAL OR ATHLETIC GROUPS, OR SCHOOL GROUPS?: NO
DO YOU BELONG TO ANY CLUBS OR ORGANIZATIONS SUCH AS CHURCH GROUPS UNIONS, FRATERNAL OR ATHLETIC GROUPS, OR SCHOOL GROUPS?: NO
HOW OFTEN DO YOU GET TOGETHER WITH FRIENDS OR RELATIVES?: NEVER

## 2024-02-14 ENCOUNTER — APPOINTMENT (OUTPATIENT)
Dept: MEDICAL GROUP | Facility: PHYSICIAN GROUP | Age: 59
End: 2024-02-14
Payer: COMMERCIAL

## 2024-03-24 SDOH — HEALTH STABILITY: PHYSICAL HEALTH

## 2024-03-24 SDOH — HEALTH STABILITY: MENTAL HEALTH
STRESS IS WHEN SOMEONE FEELS TENSE, NERVOUS, ANXIOUS, OR CAN'T SLEEP AT NIGHT BECAUSE THEIR MIND IS TROUBLED. HOW STRESSED ARE YOU?: NOT AT ALL

## 2024-03-24 SDOH — ECONOMIC STABILITY: HOUSING INSECURITY: IN THE LAST 12 MONTHS, HOW MANY PLACES HAVE YOU LIVED?: 0

## 2024-03-24 SDOH — ECONOMIC STABILITY: TRANSPORTATION INSECURITY

## 2024-03-24 SDOH — HEALTH STABILITY: PHYSICAL HEALTH: ON AVERAGE, HOW MANY MINUTES DO YOU ENGAGE IN EXERCISE AT THIS LEVEL?: 10 MIN

## 2024-03-24 SDOH — ECONOMIC STABILITY: HOUSING INSECURITY

## 2024-03-24 ASSESSMENT — SOCIAL DETERMINANTS OF HEALTH (SDOH)
HOW OFTEN DO YOU ATTEND CHURCH OR RELIGIOUS SERVICES?: PATIENT DECLINED
DO YOU BELONG TO ANY CLUBS OR ORGANIZATIONS SUCH AS CHURCH GROUPS UNIONS, FRATERNAL OR ATHLETIC GROUPS, OR SCHOOL GROUPS?: NO
HOW OFTEN DO YOU GET TOGETHER WITH FRIENDS OR RELATIVES?: PATIENT DECLINED
IN A TYPICAL WEEK, HOW MANY TIMES DO YOU TALK ON THE PHONE WITH FAMILY, FRIENDS, OR NEIGHBORS?: PATIENT DECLINED
IN A TYPICAL WEEK, HOW MANY TIMES DO YOU TALK ON THE PHONE WITH FAMILY, FRIENDS, OR NEIGHBORS?: PATIENT DECLINED
HOW OFTEN DO YOU GET TOGETHER WITH FRIENDS OR RELATIVES?: PATIENT DECLINED
DO YOU BELONG TO ANY CLUBS OR ORGANIZATIONS SUCH AS CHURCH GROUPS UNIONS, FRATERNAL OR ATHLETIC GROUPS, OR SCHOOL GROUPS?: NO
HOW OFTEN DO YOU ATTEND CHURCH OR RELIGIOUS SERVICES?: PATIENT DECLINED

## 2024-03-27 ENCOUNTER — OFFICE VISIT (OUTPATIENT)
Dept: MEDICAL GROUP | Facility: PHYSICIAN GROUP | Age: 59
End: 2024-03-27
Payer: COMMERCIAL

## 2024-03-27 VITALS
OXYGEN SATURATION: 97 % | DIASTOLIC BLOOD PRESSURE: 72 MMHG | RESPIRATION RATE: 16 BRPM | HEIGHT: 63 IN | WEIGHT: 195 LBS | SYSTOLIC BLOOD PRESSURE: 126 MMHG | BODY MASS INDEX: 34.55 KG/M2 | HEART RATE: 81 BPM | TEMPERATURE: 97.5 F

## 2024-03-27 DIAGNOSIS — Z00.00 ROUTINE HEALTH MAINTENANCE: ICD-10-CM

## 2024-03-27 DIAGNOSIS — Z11.4 SCREENING FOR HIV WITHOUT PRESENCE OF RISK FACTORS: ICD-10-CM

## 2024-03-27 DIAGNOSIS — Z11.59 NEED FOR HEPATITIS C SCREENING TEST: ICD-10-CM

## 2024-03-27 DIAGNOSIS — Z23 NEED FOR VACCINATION: ICD-10-CM

## 2024-03-27 DIAGNOSIS — R23.2 HOT FLASHES: ICD-10-CM

## 2024-03-27 DIAGNOSIS — E78.5 DYSLIPIDEMIA: ICD-10-CM

## 2024-03-27 DIAGNOSIS — I10 PRIMARY HYPERTENSION: ICD-10-CM

## 2024-03-27 DIAGNOSIS — R73.03 PRE-DIABETES: ICD-10-CM

## 2024-03-27 DIAGNOSIS — Z00.00 ENCOUNTER FOR WELL ADULT EXAM WITHOUT ABNORMAL FINDINGS: ICD-10-CM

## 2024-03-27 DIAGNOSIS — M76.62 ACHILLES TENDINITIS, LEFT LEG: ICD-10-CM

## 2024-03-27 DIAGNOSIS — E66.9 OBESITY (BMI 30-39.9): ICD-10-CM

## 2024-03-27 PROCEDURE — 3078F DIAST BP <80 MM HG: CPT | Performed by: NURSE PRACTITIONER

## 2024-03-27 PROCEDURE — 90471 IMMUNIZATION ADMIN: CPT | Performed by: NURSE PRACTITIONER

## 2024-03-27 PROCEDURE — 90677 PCV20 VACCINE IM: CPT | Performed by: NURSE PRACTITIONER

## 2024-03-27 PROCEDURE — 3074F SYST BP LT 130 MM HG: CPT | Performed by: NURSE PRACTITIONER

## 2024-03-27 PROCEDURE — 99396 PREV VISIT EST AGE 40-64: CPT | Mod: 25 | Performed by: NURSE PRACTITIONER

## 2024-03-27 NOTE — PROGRESS NOTES
Subjective:   CC:   Chief Complaint   Patient presents with    Annual Exam     HPI:   Montserrat Harris is a 59 y.o. female who presents for annual exam:    History of Present Illness         No problem-specific Assessment & Plan notes found for this encounter.     Anticipatory Guidance:  Cholesterol screening: ***   LDL controlled: ***  Diabetes screening: ***   A1c controlled: ***   UALB/CR: ***  Retinal exam: ***  Monofilament: ***  Diet: Recommend more lean meats, fruits, vegetables, whole grains. ***  Exercise: Encourage regular exercise. ***  Substance abuse: ***No ***Yes  Safe in relationship: ***No ***Yes  Seatbelts, bike/motorcycle helmet: ***  Sun protection: ***Yes ***Recommended  Dentist: ***Up to date ***Due for follow up  Eye doctor: ***Up to date ***Due for follow up    Cancer Screening:  Colorectal cancer screening: ***, NA due at age 45  Cervical cancer screening: ***  Breast cancer screening: ***, NA due at age 40, ***+ family history of breast cancer    Infectious Disease Screening/Immunizations:  STI screening: ***  Chlamydia/Gonorrhea screening: ***  Hep C screening: ***  HIV screen: ***  Practices safe sex: ***    Immunizations:   Influenza: ***   Tetanus: ***   Hep B: ***, ***, ***   Pneumonia: ***  RSV: ***  Shingrix: *** & ***   Received HPV series: {YES/NO/UNK/AGED OUT:85117}    Preventative Care Screening:   Osteoporosis Screening: ***NA, due at age 65  Tobacco Screening: ***     AAA Screening: ***    Patient's last menstrual period was 2014 (within months). 48  She {HAS HAS NOT:32659} utilized hormone replacement therapy.  Reports hot flashes  No significant bloating/fluid retention, pelvic pain, or dyspareunia. No abnormal vaginal discharge.   No breast tenderness, mass, nipple discharge or changes in size or contour.    OB History    Para Term  AB Living   4 2 2 0 2 2   SAB IAB Ectopic Molar Multiple Live Births   2 0 0 0 0 2     She  reports that she is not currently  sexually active and has had partner(s) who are male.  She  has a past medical history of Fibroid, Hypertension, and Prediabetes.  She  has a past surgical history that includes ankle arthroscopy (Right).    Family History   Problem Relation Age of Onset    No Known Problems Mother     Cancer Father     Lung Cancer Father         asbestos    Obesity Sister     Heart Failure Brother     No Known Problems Maternal Grandmother     No Known Problems Maternal Grandfather     No Known Problems Paternal Grandmother     No Known Problems Paternal Grandfather     No Known Problems Daughter     No Known Problems Son      Social History     Tobacco Use    Smoking status: Never    Smokeless tobacco: Never   Vaping Use    Vaping Use: Never used   Substance Use Topics    Alcohol use: Never    Drug use: Never     Patient Active Problem List    Diagnosis Date Noted    Primary hypertension 01/04/2023    Pre-diabetes 01/04/2023    Achilles tendinitis, left leg 01/04/2023    Hot flashes 01/04/2023    Mixed stress and urge urinary incontinence 01/04/2023    Dyslipidemia 01/04/2023    Deaf, nonspeaking 01/04/2023    Wheezing 01/04/2023    Obesity (BMI 30-39.9) 01/04/2023     Current Outpatient Medications   Medication Sig Dispense Refill    lisinopril-hydrochlorothiazide (PRINZIDE) 10-12.5 MG per tablet Take 1 Tablet by mouth every day. 90 Tablet 1    albuterol 108 (90 Base) MCG/ACT Aero Soln inhalation aerosol Inhale 2 Puffs every four hours as needed for Shortness of Breath. 1 Each 0    PARoxetine (PAXIL) 10 MG Tab Take 1 Tablet by mouth at bedtime. 90 Tablet 0    metFORMIN (GLUCOPHAGE) 500 MG Tab       Fenofibrate 150 MG Cap        No current facility-administered medications for this visit.     No Known Allergies    Review of Systems ***  Constitutional: Negative for fever, chills and malaise/fatigue.   HENT: Negative for congestion.    Eyes: Negative for pain.   Respiratory: Negative for cough and shortness of breath.   "  Cardiovascular: Negative for chest pain and leg swelling.   Gastrointestinal: Negative for nausea, vomiting, abdominal pain and diarrhea.   Genitourinary: Negative for dysuria and hematuria.   Skin: Negative for rash.   Neurological: Negative for dizziness, focal weakness and headaches.   Endo/Heme/Allergies: Does not bruise/bleed easily.   Psychiatric/Behavioral: Negative for depression.  The patient is not nervous/anxious.      Objective:   /72   Pulse 81   Temp 36.4 °C (97.5 °F) (Temporal)   Resp 16   Ht 1.6 m (5' 3\")   Wt 88.5 kg (195 lb)   LMP 01/01/2014 (Within Months)   SpO2 97%   BMI 34.54 kg/m²     Wt Readings from Last 4 Encounters:   03/27/24 88.5 kg (195 lb)   01/02/24 88.5 kg (195 lb)   07/05/23 88.9 kg (196 lb)   05/29/23 89.4 kg (197 lb)     {Chaperone Offered? (Optional):26008}    Physical Exam:***  Constitutional: Well-developed and well-nourished. Not diaphoretic. No distress.   Skin: Skin is warm and dry. No rash noted.  Head: Atraumatic without lesions.  Eyes: Conjunctivae and extraocular motions are normal. Pupils are equal, round, and reactive to light. No scleral icterus.   Ears:  External ears unremarkable. Tympanic membranes clear and intact.  Nose: Nares patent. Septum midline. Turbinates without erythema nor edema. No discharge.   Mouth/Throat: Tongue normal. Oropharynx is clear and moist. Posterior pharynx without erythema or exudates.  Neck: Supple, trachea midline. Normal range of motion. No thyromegaly present. No lymphadenopathy--cervical or supraclavicular.  Cardiovascular: Regular rate and rhythm, S1 and S2 without murmur, rubs, or gallops.    Respiratory: Effort normal. Clear to auscultation throughout. No adventitious sounds.   Breast:  ***Breasts examined seated and supine. No skin changes, peau d'orange or nipple retraction. No discharge. Breast move freely and equally without restriction. No axillary or supraclavicular adenopathy. No masses or nodularity " palpable.   ***Breast exam deferred. Discussed monthly self exams and what to look for, including peau d'orange or nipple retraction, discharge, breasts moving freely and equally without restriction, axillary/supraclavicular adenopathy, or palpable masses/nodules.  ***R/L Breast: No dimpling, no breast tenderness, nodules or masses.  No axillary adenopathy.  No nipple discharge.  ***R/L: *** cm mass palpated at *** OC, *** centimeters from nipple.  No nipple discharge.  No axillary adenopathy.   Abdomen: Soft, non tender, and without distention. Active bowel sounds in all four quadrants. No rebound, guarding, ***masses or HSM***.  {PELVIC EXAM (Optional):14235}  Extremities: No cyanosis, clubbing, erythema, nor edema. Radial pulses intact and symmetric.   Musculoskeletal: All major joints AROM full in all directions without pain.  Neurological: Alert and oriented x 3. Grossly non-focal. Strength and sensation grossly intact.   Psychiatric:  Behavior, mood, and affect are appropriate.    Assessment and Plan:   ***    Assessment & Plan         Health maintenance: ***Up to date   Labs per orders  Immunizations: ***Declines *** Per orders, ***Up to date  Patient counseled about skin care, diet, supplements, and exercise.  Discussed  {GYNCOUNSEL:21759}.     Follow-up: No follow-ups on file.     I have placed the below orders and discussed them with an approved delegating provider. The MA is performing the below orders under the direction of Dr. Isabel.      Please note that this dictation was created using voice recognition software. I have worked with consultants from the vendor as well as technical experts from [facility] to optimize the interface. I have made every reasonable attempt to correct obvious errors, but I expect that there are errors of grammar and possibly content that I did not discover before finalizing the note.     Attestation         Metabolic Panel; Future  - CBC WITH DIFFERENTIAL; Future    3. Dyslipidemia  Chronic, ongoing. Continue fenofibrate 150 mg daily. Due for updated annual labs prior to annual follow up.  - TSH WITH REFLEX TO FT4; Future  - Lipid Profile; Future  - Comp Metabolic Panel; Future    4. Obesity (BMI 30-39.9)  Chronic, ongoing.   Encourage diet high in fruits, vegetables, and fiber. And a diet low in salt, refined carbohydrates, cholesterol, saturated fat, and trans fatty acids.    Encourage a minimum of 30 minutes of moderate intensity aerobic exercise (eg, brisk walking) is recommended on five days each week. Or 30 minutes of vigorous-intensity aerobic exercise (eg, jogging) on three days each week.   Patient's body mass index is 34.54 kg/m². Exercise and nutrition counseling were performed at this visit.  Due for updated annual labs prior to annual follow up.  - HEMOGLOBIN A1C; Future  - TSH WITH REFLEX TO FT4; Future  - Lipid Profile; Future  - Comp Metabolic Panel; Future  - CBC WITH DIFFERENTIAL; Future    5. Pre-diabetes  Chronic, ongoing. Continue metformin 500 mg daily, does not need a refill at this time. Due for updated annual labs prior to annual follow up.  - MICROALBUMIN CREAT RATIO URINE; Future  - HEMOGLOBIN A1C; Future  - Lipid Profile; Future  - Comp Metabolic Panel; Future    6. Achilles tendinitis, left leg  Chronic, ongoing. Xray completed January 2023 showed soft tissue thickening within the region of the distal achilles tendon suggesting achilles tendinopathy. Patient reports pain is more persistent. Referral to orthopedics.   - Referral to Orthopedics    7. Hot flashes  Chronic, improving. Patient reports hot flashes have nearly resolved and she has self discontinued paroxetine, does not want to continue the medication.    8. Routine health maintenance  Due for updated annual labs. Referral to gynecology for pap smear.  - MICROALBUMIN CREAT RATIO URINE; Future  - HEMOGLOBIN A1C; Future  - TSH WITH  REFLEX TO FT4; Future  - Lipid Profile; Future  - HIV AG/AB COMBO ASSAY SCREENING; Future  - HEP C VIRUS ANTIBODY; Future  - Comp Metabolic Panel; Future  - CBC WITH DIFFERENTIAL; Future  - Referral to Gynecology    9. Need for hepatitis C screening test  Due for one time screening.  - HEP C VIRUS ANTIBODY; Future    10. Screening for HIV without presence of risk factors  Due for one time screening.  - HIV AG/AB COMBO ASSAY SCREENING; Future    11. Need for vaccination  Given today.  - Pneumococcal Conjugate Vaccine 20-Valent (6 wks+)     Health maintenance: Up to date   Labs per orders  Immunizations: Per orders   Patient counseled about skin care, diet, supplements, and exercise.  Discussed  breast self exam, mammography screening, menopause, osteoporosis, adequate intake of calcium and vitamin D, diet and exercise, colorectal cancer screening, pap smear.     Follow-up: Return in about 1 year (around 3/27/2025) for Preventative Annual, Follow up Labs.     I have placed the below orders and discussed them with an approved delegating provider. The MA is performing the below orders under the direction of Dr. Allen.      Please note that this dictation was created using voice recognition software. I have worked with consultants from the vendor as well as technical experts from [employer] [facility] [employer] to optimize the interface. I have made every reasonable attempt to correct obvious errors, but I expect that there are errors of grammar and possibly content that I did not discover before finalizing the note.     Verbal consent was acquired by the patient to use [employer] ambient listening note generation during this visit Yes

## 2024-03-27 NOTE — LETTER
March 27, 2024         Patient: Montserrat Harris   YOB: 1965   Date of Visit: 3/27/2024           To Whom it May Concern:    Montserrat Harris was seen in my clinic on 3/27/2024. She may return to work on Thursday, March 28, 2024.    If you have any questions or concerns, please don't hesitate to call.        Sincerely,           RIGO Durham.  Electronically Signed

## 2024-04-07 VITALS
BODY MASS INDEX: 34.55 KG/M2 | TEMPERATURE: 97.5 F | HEIGHT: 63 IN | OXYGEN SATURATION: 97 % | DIASTOLIC BLOOD PRESSURE: 72 MMHG | RESPIRATION RATE: 16 BRPM | SYSTOLIC BLOOD PRESSURE: 126 MMHG | WEIGHT: 195 LBS | HEART RATE: 81 BPM

## 2024-05-16 ENCOUNTER — OFFICE VISIT (OUTPATIENT)
Dept: MEDICAL GROUP | Facility: PHYSICIAN GROUP | Age: 59
End: 2024-05-16
Payer: COMMERCIAL

## 2024-05-16 VITALS
OXYGEN SATURATION: 97 % | HEIGHT: 64 IN | WEIGHT: 203.6 LBS | BODY MASS INDEX: 34.76 KG/M2 | DIASTOLIC BLOOD PRESSURE: 70 MMHG | SYSTOLIC BLOOD PRESSURE: 124 MMHG | HEART RATE: 103 BPM | TEMPERATURE: 97.8 F

## 2024-05-16 DIAGNOSIS — Z02.89 ENCOUNTER FOR COMPLETION OF FORM WITH PATIENT: ICD-10-CM

## 2024-05-16 DIAGNOSIS — M76.62 ACHILLES TENDINITIS, LEFT LEG: ICD-10-CM

## 2024-05-16 PROCEDURE — 3078F DIAST BP <80 MM HG: CPT | Performed by: NURSE PRACTITIONER

## 2024-05-16 PROCEDURE — 3074F SYST BP LT 130 MM HG: CPT | Performed by: NURSE PRACTITIONER

## 2024-05-16 PROCEDURE — 99214 OFFICE O/P EST MOD 30 MIN: CPT | Performed by: NURSE PRACTITIONER

## 2024-05-17 ENCOUNTER — HOSPITAL ENCOUNTER (OUTPATIENT)
Dept: LAB | Facility: MEDICAL CENTER | Age: 59
End: 2024-05-17
Attending: NURSE PRACTITIONER
Payer: COMMERCIAL

## 2024-05-17 DIAGNOSIS — Z11.4 SCREENING FOR HIV WITHOUT PRESENCE OF RISK FACTORS: ICD-10-CM

## 2024-05-17 DIAGNOSIS — E66.9 OBESITY (BMI 30-39.9): ICD-10-CM

## 2024-05-17 DIAGNOSIS — Z11.59 NEED FOR HEPATITIS C SCREENING TEST: ICD-10-CM

## 2024-05-17 DIAGNOSIS — I10 PRIMARY HYPERTENSION: ICD-10-CM

## 2024-05-17 DIAGNOSIS — E78.5 DYSLIPIDEMIA: ICD-10-CM

## 2024-05-17 DIAGNOSIS — R73.03 PRE-DIABETES: ICD-10-CM

## 2024-05-17 DIAGNOSIS — Z00.00 ROUTINE HEALTH MAINTENANCE: ICD-10-CM

## 2024-05-17 LAB
ALBUMIN SERPL BCP-MCNC: 4.4 G/DL (ref 3.2–4.9)
ALBUMIN/GLOB SERPL: 1.6 G/DL
ALP SERPL-CCNC: 36 U/L (ref 30–99)
ALT SERPL-CCNC: 20 U/L (ref 2–50)
ANION GAP SERPL CALC-SCNC: 12 MMOL/L (ref 7–16)
AST SERPL-CCNC: 19 U/L (ref 12–45)
BASOPHILS # BLD AUTO: 0.8 % (ref 0–1.8)
BASOPHILS # BLD: 0.04 K/UL (ref 0–0.12)
BILIRUB SERPL-MCNC: 0.4 MG/DL (ref 0.1–1.5)
BUN SERPL-MCNC: 29 MG/DL (ref 8–22)
CALCIUM ALBUM COR SERPL-MCNC: 9.2 MG/DL (ref 8.5–10.5)
CALCIUM SERPL-MCNC: 9.5 MG/DL (ref 8.5–10.5)
CHLORIDE SERPL-SCNC: 104 MMOL/L (ref 96–112)
CHOLEST SERPL-MCNC: 158 MG/DL (ref 100–199)
CO2 SERPL-SCNC: 24 MMOL/L (ref 20–33)
CREAT SERPL-MCNC: 1.1 MG/DL (ref 0.5–1.4)
EOSINOPHIL # BLD AUTO: 0.34 K/UL (ref 0–0.51)
EOSINOPHIL NFR BLD: 7.2 % (ref 0–6.9)
ERYTHROCYTE [DISTWIDTH] IN BLOOD BY AUTOMATED COUNT: 41.7 FL (ref 35.9–50)
EST. AVERAGE GLUCOSE BLD GHB EST-MCNC: 134 MG/DL
FASTING STATUS PATIENT QL REPORTED: NORMAL
GFR SERPLBLD CREATININE-BSD FMLA CKD-EPI: 58 ML/MIN/1.73 M 2
GLOBULIN SER CALC-MCNC: 2.8 G/DL (ref 1.9–3.5)
GLUCOSE SERPL-MCNC: 133 MG/DL (ref 65–99)
HBA1C MFR BLD: 6.3 % (ref 4–5.6)
HCT VFR BLD AUTO: 43.2 % (ref 37–47)
HCV AB SER QL: NORMAL
HDLC SERPL-MCNC: 51 MG/DL
HGB BLD-MCNC: 13.9 G/DL (ref 12–16)
HIV 1+2 AB+HIV1 P24 AG SERPL QL IA: NORMAL
IMM GRANULOCYTES # BLD AUTO: 0.02 K/UL (ref 0–0.11)
IMM GRANULOCYTES NFR BLD AUTO: 0.4 % (ref 0–0.9)
LDLC SERPL CALC-MCNC: 87 MG/DL
LYMPHOCYTES # BLD AUTO: 1.63 K/UL (ref 1–4.8)
LYMPHOCYTES NFR BLD: 34.6 % (ref 22–41)
MCH RBC QN AUTO: 29.3 PG (ref 27–33)
MCHC RBC AUTO-ENTMCNC: 32.2 G/DL (ref 32.2–35.5)
MCV RBC AUTO: 90.9 FL (ref 81.4–97.8)
MONOCYTES # BLD AUTO: 0.4 K/UL (ref 0–0.85)
MONOCYTES NFR BLD AUTO: 8.5 % (ref 0–13.4)
NEUTROPHILS # BLD AUTO: 2.28 K/UL (ref 1.82–7.42)
NEUTROPHILS NFR BLD: 48.5 % (ref 44–72)
NRBC # BLD AUTO: 0 K/UL
NRBC BLD-RTO: 0 /100 WBC (ref 0–0.2)
PLATELET # BLD AUTO: 236 K/UL (ref 164–446)
PMV BLD AUTO: 11.8 FL (ref 9–12.9)
POTASSIUM SERPL-SCNC: 4.3 MMOL/L (ref 3.6–5.5)
PROT SERPL-MCNC: 7.2 G/DL (ref 6–8.2)
RBC # BLD AUTO: 4.75 M/UL (ref 4.2–5.4)
SODIUM SERPL-SCNC: 140 MMOL/L (ref 135–145)
TRIGL SERPL-MCNC: 101 MG/DL (ref 0–149)
TSH SERPL DL<=0.005 MIU/L-ACNC: 4.17 UIU/ML (ref 0.38–5.33)
WBC # BLD AUTO: 4.7 K/UL (ref 4.8–10.8)

## 2024-05-18 DIAGNOSIS — Z00.00 ROUTINE HEALTH MAINTENANCE: ICD-10-CM

## 2024-05-18 DIAGNOSIS — R73.03 PRE-DIABETES: ICD-10-CM

## 2024-05-18 DIAGNOSIS — E66.9 OBESITY (BMI 30-39.9): ICD-10-CM

## 2024-05-18 DIAGNOSIS — E78.5 DYSLIPIDEMIA: ICD-10-CM

## 2024-05-18 DIAGNOSIS — I10 PRIMARY HYPERTENSION: ICD-10-CM

## 2024-05-18 LAB
CREAT UR-MCNC: 115.41 MG/DL
MICROALBUMIN UR-MCNC: <1.2 MG/DL
MICROALBUMIN/CREAT UR: NORMAL MG/G (ref 0–30)

## 2024-05-18 NOTE — PROGRESS NOTES
1. Dyslipidemia  - Comp Metabolic Panel; Future  - Lipid Profile; Future  - TSH WITH REFLEX TO FT4; Future    2. Obesity (BMI 30-39.9)  - HEMOGLOBIN A1C; Future  - CBC WITH DIFFERENTIAL; Future  - Comp Metabolic Panel; Future  - Lipid Profile; Future  - TSH WITH REFLEX TO FT4; Future    3. Pre-diabetes  - HEMOGLOBIN A1C; Future  - Comp Metabolic Panel; Future  - Lipid Profile; Future  - MICROALBUMIN CREAT RATIO URINE; Future    4. Primary hypertension  - CBC WITH DIFFERENTIAL; Future  - Comp Metabolic Panel; Future  - MICROALBUMIN CREAT RATIO URINE; Future  - TSH WITH REFLEX TO FT4; Future    5. Routine health maintenance  - HEMOGLOBIN A1C; Future  - CBC WITH DIFFERENTIAL; Future  - Comp Metabolic Panel; Future  - Lipid Profile; Future  - MICROALBUMIN CREAT RATIO URINE; Future  - TSH WITH REFLEX TO FT4; Future

## 2024-05-19 RX ORDER — MELOXICAM 7.5 MG/1
7.5 TABLET ORAL DAILY
Qty: 90 TABLET | Refills: 0 | Status: SHIPPED | OUTPATIENT
Start: 2024-05-19

## 2024-05-19 NOTE — PROGRESS NOTES
"Verbal consent was acquired by the patient to use [employer] ambient listening note generation during this visit Yes      Subjective   Montserrat Harris is a 59 y.o. female who presents for ankle pain and FMLA paperwork.  History of Present Illness  The patient presents for follow up of Achilles tendonitis pain and FMLA paperwork. She is accompanied by her  who is assisting with sign language interpretation.    The patient's  reports that they sought medical attention at [facility] in 2 weeks ago for ankle pain, during which an x-ray was performed. Subsequently, she was fitted with a boot and advised to return for a follow-up in 2 weeks if the condition did not improve with the use of the boot. The patient's symptoms have been persistent for an extended period. She ceased work on 04/20/2024 and has been advised to remain off work while wearing the boot. The patient is requesting FMLA due to the patient's occupation at [employer], which involves standing in one place and pushing boxes. The patient receives a total of 12 weeks of FMLA. The patient reports that the boot has been beneficial and resting has improved her pain. Her  inquires about potential medication to reduce the swelling, as the patient does not consistently take aspirin. The patient does not report significant ankle pain.    ROS:  See HPI    Objective   /70 (BP Location: Left arm, Patient Position: Sitting, BP Cuff Size: Adult)   Pulse (!) 103   Temp 36.6 °C (97.8 °F) (Temporal)   Ht 1.633 m (5' 4.3\")   Wt 92.4 kg (203 lb 9.6 oz)   SpO2 97%   Physical Exam  General: Well nourished, well developed female in NAD, awake and conversant.  Eyes: Normal conjunctiva, anicteric.  Round symmetrical pupils.  ENT: Hearing grossly intact.  No nasal discharge.  Neck: Neck is supple.  No masses or thyromegaly.  CV: No lower extremity edema.  Respiratory: Respirations are nonlabored.  No wheezing.  Abdomen: Non-Distended.  Skin: Warm.  No rashes or " ulcers.  MSK: Antalgic gait with left leg walking boot.  No clubbing or cyanosis.  Neuro: Sensation and CN II-XII grossly normal.  Psych: Alert and oriented.  Cooperative, appropriate mood and affect, normal judgment.      Results  Imaging  X-ray in January 2023 showed Achilles tendinopathy.    Assessment & Plan  1. Achilles tendinitis, left leg  2. Encounter for completion of form with patient  Chronic, ongoing. The patient has been referred to PT by [facility]. She will continue to use walking boot at all times. FMLA paperwork has been completed, with a full 3-month leave period set for 07/30/2024. The patient is advised to rest and consistently walk with the boot on, with the option to apply warm or cold, whichever provides relief. The patient is instructed to refrain from exercise until after the physical therapy appointment. A prescription for meloxicam 7.5 mg, to be taken once daily with food, has been issued. Should the physical therapy and boot prove ineffective, the patient will return to orthopedics at [facility] for a potential MRI and debridement. FMLA paperwork has been scanned into media and original returned to patient.  - meloxicam (MOBIC) 7.5 MG Tab; Take 1 Tablet by mouth every day.  Dispense: 90 Tablet; Refill: 0    Return in about 45 weeks (around 3/27/2025), or if symptoms worsen or fail to improve, for Preventative Annual.     My total time spent caring for the patient on the day of the encounter was 35 minutes.     Please note that this dictation was created using voice recognition software. I have made every reasonable attempt to correct obvious errors, but I expect that there are errors of grammar and possibly content that I did not discover before finalizing the note.

## 2024-06-13 ENCOUNTER — OFFICE VISIT (OUTPATIENT)
Dept: MEDICAL GROUP | Facility: PHYSICIAN GROUP | Age: 59
End: 2024-06-13
Payer: COMMERCIAL

## 2024-06-13 VITALS
OXYGEN SATURATION: 95 % | HEART RATE: 91 BPM | SYSTOLIC BLOOD PRESSURE: 120 MMHG | TEMPERATURE: 98 F | WEIGHT: 202.2 LBS | HEIGHT: 64 IN | BODY MASS INDEX: 34.52 KG/M2 | DIASTOLIC BLOOD PRESSURE: 70 MMHG

## 2024-06-13 DIAGNOSIS — M76.62 ACHILLES TENDINITIS, LEFT LEG: ICD-10-CM

## 2024-06-13 DIAGNOSIS — Z02.89 ENCOUNTER FOR COMPLETION OF FORM WITH PATIENT: ICD-10-CM

## 2024-06-13 PROCEDURE — 3078F DIAST BP <80 MM HG: CPT | Performed by: NURSE PRACTITIONER

## 2024-06-13 PROCEDURE — 3074F SYST BP LT 130 MM HG: CPT | Performed by: NURSE PRACTITIONER

## 2024-06-13 PROCEDURE — 99213 OFFICE O/P EST LOW 20 MIN: CPT | Performed by: NURSE PRACTITIONER

## 2024-06-13 ASSESSMENT — FIBROSIS 4 INDEX: FIB4 SCORE: 1.06

## 2024-06-17 ASSESSMENT — ENCOUNTER SYMPTOMS
CONSTITUTIONAL NEGATIVE: 1
CARDIOVASCULAR NEGATIVE: 1
HEMATOLOGIC/LYMPHATIC NEGATIVE: 1
JOINT SWELLING: 1
MYALGIAS: 1
ALLERGIC/IMMUNOLOGIC NEGATIVE: 1
PSYCHIATRIC NEGATIVE: 1
ENDOCRINE NEGATIVE: 1
EYES NEGATIVE: 1
GASTROINTESTINAL NEGATIVE: 1
RESPIRATORY NEGATIVE: 1

## 2024-06-17 NOTE — PROGRESS NOTES
"Verbal consent was acquired by the patient to use [employer] ambient listening note generation during this visit Yes      Subjective   Montserrat Harris is a 59 y.o. female who presents for paperwork.  History of Present Illness  The patient presents for paperwork completion. She is accompanied by her  who serves as an .    The patient's  reports that her employer has requested a state disability form to be completed. The patient exhibits confusion and is unable to articulate her thoughts. The patient's mobility is limited due to left leg Achilles tendinitis and needing to wear a walking boot at all times.  Her current job requires that she stands in one position her entire shift, as opposed to walking. The  reports that the patient's ability to stretch the tendon is compromised when standing in one position. The patient's , who assists with providing information, plans to accompany the patient once the paperwork is completed to drop it off at her employer. The patient has not yet commenced physical therapy.    Review of Systems   Constitutional: Negative.    HENT: Negative.     Eyes: Negative.    Respiratory: Negative.     Cardiovascular: Negative.    Gastrointestinal: Negative.    Endocrine: Negative.    Genitourinary: Negative.    Musculoskeletal:  Positive for gait problem, joint swelling and myalgias.        Left leg Achilles tendinitis, left walking boot   Skin: Negative.    Allergic/Immunologic: Negative.    Hematological: Negative.    Psychiatric/Behavioral: Negative.       Objective   /70 (BP Location: Left arm, Patient Position: Sitting, BP Cuff Size: Adult)   Pulse 91   Temp 36.7 °C (98 °F) (Temporal)   Ht 1.626 m (5' 4\")   Wt 91.7 kg (202 lb 3.2 oz)   SpO2 95%   Physical Exam  General: Well nourished, well developed female in NAD, awake and conversant.  Eyes: Normal conjunctiva, anicteric.  Round symmetrical pupils.  ENT: Patient is deaf.  No nasal " discharge.  Neck: Neck is supple.  No masses or thyromegaly.  CV: No lower extremity edema.  Respiratory: Respirations are nonlabored.  No wheezing.  Abdomen: Non-Distended.  Skin: Warm.  No rashes or ulcers.  MSK: Antalgic gait with left foot walking boot, left Achilles tendon swelling and tenderness to palpation.  No clubbing or cyanosis.  Neuro: Sensation and CN II-XII grossly normal.  Psych: Alert and oriented.  Cooperative, appropriate mood and affect, normal judgment.      Assessment & Plan  1. Achilles tendinitis, left leg  2. Encounter for completion of form with patient  Chronic, ongoing.  FMLA/disability paperwork completed, scanned into patient's chart, original returned to patient.  Encourage patient to schedule physical therapy soon as possible.  Continue to follow with orthopedics at [facility].    Return if symptoms worsen or fail to improve.     Please note that this dictation was created using voice recognition software. I have made every reasonable attempt to correct obvious errors, but I expect that there are errors of grammar and possibly content that I did not discover before finalizing the note.

## 2024-07-23 ENCOUNTER — APPOINTMENT (OUTPATIENT)
Dept: MEDICAL GROUP | Facility: PHYSICIAN GROUP | Age: 59
End: 2024-07-23
Payer: COMMERCIAL

## 2024-07-31 ENCOUNTER — OFFICE VISIT (OUTPATIENT)
Dept: MEDICAL GROUP | Facility: PHYSICIAN GROUP | Age: 59
End: 2024-07-31
Payer: COMMERCIAL

## 2024-07-31 VITALS
DIASTOLIC BLOOD PRESSURE: 66 MMHG | WEIGHT: 203 LBS | SYSTOLIC BLOOD PRESSURE: 120 MMHG | OXYGEN SATURATION: 94 % | HEIGHT: 63 IN | RESPIRATION RATE: 18 BRPM | TEMPERATURE: 96.8 F | HEART RATE: 107 BPM | BODY MASS INDEX: 35.97 KG/M2

## 2024-07-31 DIAGNOSIS — M76.62 ACHILLES TENDINITIS, LEFT LEG: ICD-10-CM

## 2024-07-31 ASSESSMENT — FIBROSIS 4 INDEX: FIB4 SCORE: 1.06

## 2024-08-12 DIAGNOSIS — M76.62 ACHILLES TENDINITIS, LEFT LEG: ICD-10-CM

## 2024-08-12 RX ORDER — MELOXICAM 7.5 MG/1
7.5 TABLET ORAL DAILY
Qty: 90 TABLET | Refills: 0 | Status: SHIPPED | OUTPATIENT
Start: 2024-08-12

## 2024-08-12 NOTE — TELEPHONE ENCOUNTER
Requested Prescriptions     Pending Prescriptions Disp Refills    meloxicam (MOBIC) 7.5 MG Tab [Pharmacy Med Name: Meloxicam 7.5 MG Oral Tablet] 90 Tablet 0     Sig: Take 1 tablet by mouth once daily

## 2024-08-12 NOTE — TELEPHONE ENCOUNTER
Received request via: Pharmacy    Was the patient seen in the last year in this department? Yes    Does the patient have an active prescription (recently filled or refills available) for medication(s) requested? No    Pharmacy Name: [facility]    Does the patient have [facility] and need 100-day supply? (This applies to ALL medications) Patient does not have SCP

## 2024-08-26 DIAGNOSIS — J45.21 MILD INTERMITTENT ASTHMA WITH EXACERBATION: ICD-10-CM

## 2024-08-26 NOTE — TELEPHONE ENCOUNTER
Received request via: Patient    Was the patient seen in the last year in this department? Yes    Does the patient have an active prescription (recently filled or refills available) for medication(s) requested? No    Pharmacy Name: [facility]    Does the patient have [facility] and need 100-day supply? (This applies to ALL medications) Patient does not have SCP

## 2024-08-28 RX ORDER — ALBUTEROL SULFATE 90 UG/1
2 AEROSOL, METERED RESPIRATORY (INHALATION) EVERY 4 HOURS PRN
Qty: 1 EACH | Refills: 11 | Status: SHIPPED | OUTPATIENT
Start: 2024-08-28

## 2024-08-28 NOTE — TELEPHONE ENCOUNTER
Requested Prescriptions     Pending Prescriptions Disp Refills    albuterol 108 (90 Base) MCG/ACT Aero Soln inhalation aerosol 1 Each 11     Sig: Inhale 2 Puffs every four hours as needed for Shortness of Breath.       RIGO Durham.

## 2024-09-10 ENCOUNTER — OFFICE VISIT (OUTPATIENT)
Dept: URGENT CARE | Facility: PHYSICIAN GROUP | Age: 59
End: 2024-09-10
Payer: COMMERCIAL

## 2024-09-10 ENCOUNTER — APPOINTMENT (OUTPATIENT)
Dept: RADIOLOGY | Facility: IMAGING CENTER | Age: 59
End: 2024-09-10
Attending: FAMILY MEDICINE
Payer: COMMERCIAL

## 2024-09-10 VITALS
OXYGEN SATURATION: 95 % | SYSTOLIC BLOOD PRESSURE: 124 MMHG | TEMPERATURE: 97.8 F | WEIGHT: 200 LBS | HEIGHT: 63 IN | DIASTOLIC BLOOD PRESSURE: 74 MMHG | HEART RATE: 98 BPM | RESPIRATION RATE: 16 BRPM | BODY MASS INDEX: 35.44 KG/M2

## 2024-09-10 DIAGNOSIS — J45.21 MILD INTERMITTENT ASTHMA WITH EXACERBATION: ICD-10-CM

## 2024-09-10 DIAGNOSIS — R05.1 ACUTE COUGH: ICD-10-CM

## 2024-09-10 PROCEDURE — 99214 OFFICE O/P EST MOD 30 MIN: CPT | Performed by: FAMILY MEDICINE

## 2024-09-10 PROCEDURE — 71046 X-RAY EXAM CHEST 2 VIEWS: CPT | Mod: TC,FY | Performed by: FAMILY MEDICINE

## 2024-09-10 PROCEDURE — 3074F SYST BP LT 130 MM HG: CPT | Performed by: FAMILY MEDICINE

## 2024-09-10 PROCEDURE — 3078F DIAST BP <80 MM HG: CPT | Performed by: FAMILY MEDICINE

## 2024-09-10 RX ORDER — INHALER,ASSIST DEVICE,MED MASK
1 SPACER (EA) MISCELLANEOUS ONCE
Status: SHIPPED | OUTPATIENT
Start: 2024-09-10 | End: 2024-09-13

## 2024-09-10 RX ORDER — METHYLPREDNISOLONE 4 MG
TABLET, DOSE PACK ORAL
Qty: 21 TABLET | Refills: 0 | Status: SHIPPED | OUTPATIENT
Start: 2024-09-10 | End: 2024-09-19

## 2024-09-10 ASSESSMENT — FIBROSIS 4 INDEX: FIB4 SCORE: 1.06

## 2024-09-10 NOTE — LETTER
September 10, 2024    To Whom It May Concern:         This is confirmation that Montserrat Harris attended her scheduled appointment with Diane Velazquez M.D. on 9/10/24. She may return to work tomorrow without any restrictions.          If you have any questions please do not hesitate to call me at the phone number listed below.    Sincerely,          Diane Velazquez M.D.  [phone]

## 2024-09-10 NOTE — PROGRESS NOTES
"  Subjective:      59 y.o. female presents to urgent care for cold symptoms that started 2 weeks ago. She is experiencing nasal congestion, sore throat, cough, and post-tussive vomiting. No fever, headaches, body aches, or diarrhea. No tobacco product use. She does have asthma for which she uses Albuterol as needed. Typically uses it a couple of times per months, since getting sick she is using it every 4 hours. She is vaccinated against COVID. No known sick contacts.    Asthma exacerbation considerations:  -Compliance with chronic asthma medications: yes  -Prior history of intubation needed to asthma: no  -Emergency department visit within the last year: no  -Intensive care unit admissions within the last year: no  -Steroid use within the last year: no  -Tobacco product use: no    She denies any other questions or concerns at this time.    Current problem list, medication, and past medical/surgical history were reviewed in Epic.    ROS  See HPI     Objective:      /74   Pulse 98   Temp 36.6 °C (97.8 °F) (Temporal)   Resp 16   Ht 1.6 m (5' 3\")   Wt 90.7 kg (200 lb)   LMP 01/01/2013 (Within Months)   SpO2 95%   BMI 35.43 kg/m²     Physical Exam  Constitutional:       General: She is not in acute distress.     Appearance: She is not diaphoretic.   HENT:      Right Ear: Tympanic membrane, ear canal and external ear normal.      Left Ear: Tympanic membrane, ear canal and external ear normal.      Mouth/Throat:      Tongue: Tongue does not deviate from midline.      Palate: No lesions.      Pharynx: Uvula midline. No oropharyngeal exudate or posterior oropharyngeal erythema.      Tonsils: No tonsillar exudate. 1+ on the right. 1+ on the left.   Cardiovascular:      Rate and Rhythm: Normal rate and regular rhythm.      Heart sounds: Normal heart sounds.   Pulmonary:      Effort: Pulmonary effort is normal. No respiratory distress.      Breath sounds: Normal breath sounds.   Neurological:      Mental Status: " She is alert.   Psychiatric:         Mood and Affect: Affect normal.         Judgment: Judgment normal.       Assessment/Plan:     1. Mild intermittent asthma with exacerbation  2. Acute cough  CXR showing no acute cardiopulmonary processes.  Most consistent with asthma exacerbation.  Medrol Dosepak has been sent.  Prescription for AeroChamber has also been sent.  - DX-CHEST-2 VIEWS; Future  - methylPREDNISolone (MEDROL DOSEPAK) 4 MG Tablet Therapy Pack; Follow schedule on package instructions.  Dispense: 21 Tablet; Refill: 0  - AEROCHAMBER PLUS-MEDIUM MASK MISC 1 Each      Instructed to return to Urgent Care or nearest Emergency Department if symptoms fail to improve, for any change in condition, further concerns, or new concerning symptoms. Patient states understanding of the plan of care and discharge instructions.    Diane Velazquez M.D.

## 2024-09-17 ENCOUNTER — HOSPITAL ENCOUNTER (EMERGENCY)
Facility: MEDICAL CENTER | Age: 59
End: 2024-09-17
Attending: EMERGENCY MEDICINE
Payer: COMMERCIAL

## 2024-09-17 ENCOUNTER — APPOINTMENT (OUTPATIENT)
Dept: RADIOLOGY | Facility: MEDICAL CENTER | Age: 59
End: 2024-09-17
Attending: EMERGENCY MEDICINE
Payer: COMMERCIAL

## 2024-09-17 ENCOUNTER — OFFICE VISIT (OUTPATIENT)
Dept: URGENT CARE | Facility: PHYSICIAN GROUP | Age: 59
End: 2024-09-17
Payer: COMMERCIAL

## 2024-09-17 VITALS
SYSTOLIC BLOOD PRESSURE: 164 MMHG | RESPIRATION RATE: 18 BRPM | TEMPERATURE: 97.5 F | OXYGEN SATURATION: 94 % | BODY MASS INDEX: 35.62 KG/M2 | WEIGHT: 201.06 LBS | HEIGHT: 63 IN | HEART RATE: 94 BPM | DIASTOLIC BLOOD PRESSURE: 71 MMHG

## 2024-09-17 VITALS
BODY MASS INDEX: 35.61 KG/M2 | WEIGHT: 201 LBS | HEART RATE: 110 BPM | RESPIRATION RATE: 16 BRPM | HEIGHT: 63 IN | OXYGEN SATURATION: 95 % | SYSTOLIC BLOOD PRESSURE: 140 MMHG | DIASTOLIC BLOOD PRESSURE: 82 MMHG | TEMPERATURE: 97.7 F

## 2024-09-17 DIAGNOSIS — R00.0 TACHYCARDIA: ICD-10-CM

## 2024-09-17 DIAGNOSIS — R00.2 PALPITATIONS: ICD-10-CM

## 2024-09-17 DIAGNOSIS — H91.3 DEAF, NONSPEAKING: ICD-10-CM

## 2024-09-17 DIAGNOSIS — R06.02 SOB (SHORTNESS OF BREATH): ICD-10-CM

## 2024-09-17 LAB
ALBUMIN SERPL BCP-MCNC: 4.3 G/DL (ref 3.2–4.9)
ALBUMIN/GLOB SERPL: 1.4 G/DL
ALP SERPL-CCNC: 42 U/L (ref 30–99)
ALT SERPL-CCNC: 18 U/L (ref 2–50)
ANION GAP SERPL CALC-SCNC: 13 MMOL/L (ref 7–16)
AST SERPL-CCNC: 22 U/L (ref 12–45)
BASOPHILS # BLD AUTO: 0.6 % (ref 0–1.8)
BASOPHILS # BLD: 0.05 K/UL (ref 0–0.12)
BILIRUB SERPL-MCNC: 0.3 MG/DL (ref 0.1–1.5)
BUN SERPL-MCNC: 33 MG/DL (ref 8–22)
CALCIUM ALBUM COR SERPL-MCNC: 9.4 MG/DL (ref 8.5–10.5)
CALCIUM SERPL-MCNC: 9.6 MG/DL (ref 8.4–10.2)
CHLORIDE SERPL-SCNC: 101 MMOL/L (ref 96–112)
CO2 SERPL-SCNC: 23 MMOL/L (ref 20–33)
CREAT SERPL-MCNC: 1.13 MG/DL (ref 0.5–1.4)
D DIMER PPP IA.FEU-MCNC: <0.27 UG/ML (FEU) (ref 0–0.5)
EKG IMPRESSION: NORMAL
EOSINOPHIL # BLD AUTO: 0.36 K/UL (ref 0–0.51)
EOSINOPHIL NFR BLD: 4.5 % (ref 0–6.9)
ERYTHROCYTE [DISTWIDTH] IN BLOOD BY AUTOMATED COUNT: 42.3 FL (ref 35.9–50)
GFR SERPLBLD CREATININE-BSD FMLA CKD-EPI: 56 ML/MIN/1.73 M 2
GLOBULIN SER CALC-MCNC: 3 G/DL (ref 1.9–3.5)
GLUCOSE SERPL-MCNC: 134 MG/DL (ref 65–99)
HCT VFR BLD AUTO: 46.8 % (ref 37–47)
HGB BLD-MCNC: 15.1 G/DL (ref 12–16)
IMM GRANULOCYTES # BLD AUTO: 0.04 K/UL (ref 0–0.11)
IMM GRANULOCYTES NFR BLD AUTO: 0.5 % (ref 0–0.9)
LYMPHOCYTES # BLD AUTO: 1.46 K/UL (ref 1–4.8)
LYMPHOCYTES NFR BLD: 18.2 % (ref 22–41)
MAGNESIUM SERPL-MCNC: 1.8 MG/DL (ref 1.5–2.5)
MCH RBC QN AUTO: 29.4 PG (ref 27–33)
MCHC RBC AUTO-ENTMCNC: 32.3 G/DL (ref 32.2–35.5)
MCV RBC AUTO: 91.2 FL (ref 81.4–97.8)
MONOCYTES # BLD AUTO: 0.48 K/UL (ref 0–0.85)
MONOCYTES NFR BLD AUTO: 6 % (ref 0–13.4)
NEUTROPHILS # BLD AUTO: 5.62 K/UL (ref 1.82–7.42)
NEUTROPHILS NFR BLD: 70.2 % (ref 44–72)
NRBC # BLD AUTO: 0 K/UL
NRBC BLD-RTO: 0 /100 WBC (ref 0–0.2)
PLATELET # BLD AUTO: 277 K/UL (ref 164–446)
PMV BLD AUTO: 11.5 FL (ref 9–12.9)
POTASSIUM SERPL-SCNC: 4.3 MMOL/L (ref 3.6–5.5)
PROT SERPL-MCNC: 7.3 G/DL (ref 6–8.2)
RBC # BLD AUTO: 5.13 M/UL (ref 4.2–5.4)
SODIUM SERPL-SCNC: 137 MMOL/L (ref 135–145)
TROPONIN T SERPL-MCNC: 12 NG/L (ref 6–19)
TROPONIN T SERPL-MCNC: 13 NG/L (ref 6–19)
TSH SERPL DL<=0.005 MIU/L-ACNC: 2.4 UIU/ML (ref 0.38–5.33)
WBC # BLD AUTO: 8 K/UL (ref 4.8–10.8)

## 2024-09-17 PROCEDURE — 3077F SYST BP >= 140 MM HG: CPT | Performed by: STUDENT IN AN ORGANIZED HEALTH CARE EDUCATION/TRAINING PROGRAM

## 2024-09-17 PROCEDURE — 99284 EMERGENCY DEPT VISIT MOD MDM: CPT

## 2024-09-17 PROCEDURE — 80053 COMPREHEN METABOLIC PANEL: CPT

## 2024-09-17 PROCEDURE — 93000 ELECTROCARDIOGRAM COMPLETE: CPT | Performed by: STUDENT IN AN ORGANIZED HEALTH CARE EDUCATION/TRAINING PROGRAM

## 2024-09-17 PROCEDURE — 85379 FIBRIN DEGRADATION QUANT: CPT

## 2024-09-17 PROCEDURE — 36415 COLL VENOUS BLD VENIPUNCTURE: CPT

## 2024-09-17 PROCEDURE — 84443 ASSAY THYROID STIM HORMONE: CPT

## 2024-09-17 PROCEDURE — 71045 X-RAY EXAM CHEST 1 VIEW: CPT

## 2024-09-17 PROCEDURE — 84484 ASSAY OF TROPONIN QUANT: CPT

## 2024-09-17 PROCEDURE — 99215 OFFICE O/P EST HI 40 MIN: CPT | Performed by: STUDENT IN AN ORGANIZED HEALTH CARE EDUCATION/TRAINING PROGRAM

## 2024-09-17 PROCEDURE — 3079F DIAST BP 80-89 MM HG: CPT | Performed by: STUDENT IN AN ORGANIZED HEALTH CARE EDUCATION/TRAINING PROGRAM

## 2024-09-17 PROCEDURE — 93005 ELECTROCARDIOGRAM TRACING: CPT | Performed by: EMERGENCY MEDICINE

## 2024-09-17 PROCEDURE — 85025 COMPLETE CBC W/AUTO DIFF WBC: CPT

## 2024-09-17 PROCEDURE — 83735 ASSAY OF MAGNESIUM: CPT

## 2024-09-17 PROCEDURE — 700105 HCHG RX REV CODE 258: Performed by: EMERGENCY MEDICINE

## 2024-09-17 PROCEDURE — 93005 ELECTROCARDIOGRAM TRACING: CPT

## 2024-09-17 RX ORDER — SODIUM CHLORIDE 9 MG/ML
1000 INJECTION, SOLUTION INTRAVENOUS ONCE
Status: COMPLETED | OUTPATIENT
Start: 2024-09-17 | End: 2024-09-17

## 2024-09-17 RX ORDER — IBUPROFEN 200 MG
400 TABLET ORAL EVERY 6 HOURS PRN
COMMUNITY

## 2024-09-17 RX ADMIN — SODIUM CHLORIDE 1000 ML: 9 INJECTION, SOLUTION INTRAVENOUS at 13:09

## 2024-09-17 ASSESSMENT — FIBROSIS 4 INDEX
FIB4 SCORE: 1.06
FIB4 SCORE: 1.06

## 2024-09-17 NOTE — ED NOTES
Medication history reviewed with pt. Med rec is complete.  Allergies reviewed, per pt  Interviewed pt with  at bedside with permission from pt.    Used  service spoke to MJ ([id number]) to verify all medications     Patient has not had any outpatient antibiotics in the last 30 days.    Pt is not on any anticoagulants

## 2024-09-17 NOTE — DISCHARGE INSTRUCTIONS
Here there is no evidence of significant arrhythmia in your heart.  Not having evidence of a myocardial infarction or heart attack.  He had no significant lectrolyte abnormality causing her palpitations.  Unfortunately unable to ascertain what rhythm you are having her palpitations with therefore I ask that you follow-up with your primary care provider for an outpatient evaluation and probable Zio patch/cardiac monitoring system.  Return to the emergency department you have severe pain, shortness of breath, profound palpitations.

## 2024-09-17 NOTE — PROGRESS NOTES
Subjective:   CHIEF COMPLAINT  Chief Complaint   Patient presents with    Shortness of Breath     Been off steroids for 2 days and the shortness breath started up again\   states he got a high BP reading today 180/84    Cough     Very mild fits, not coughing up mucous like before       HPI  Patient is deaf.  She is Kumpe by her  who signed during the visit    Montserrat Harris is a 59 y.o. female who presents with a chief complaint of heart fluttering and shortness of breath.  She was seen in urgent care last week and diagnosed with an acute asthma exacerbation.  She was placed on a Medrol Dosepak.  Reports there was some improvement of symptoms however worsened this morning.  States that she was unable to sleep last night due to the symptoms and when waking up they have gotten progressively worse.  Her  believes she was complaining of some chest pressure/pain but she is not currently experiencing the symptoms.  She has been using albuterol regularly with limited relief of symptoms.  Cough is been productive.  No hemoptysis.  No pleuritic chest pain.  No nausea or vomiting.  No fevers.  No history of VTE's.    REVIEW OF SYSTEMS  General: no fever or chills  GI: no nausea or vomiting  See [facility] [place] for further details.    PAST MEDICAL HISTORY  Patient Active Problem List    Diagnosis Date Noted    Primary hypertension 01/04/2023    Pre-diabetes 01/04/2023    Achilles tendinitis, left leg 01/04/2023    Hot flashes 01/04/2023    Mixed stress and urge urinary incontinence 01/04/2023    Dyslipidemia 01/04/2023    Deaf, nonspeaking 01/04/2023    Wheezing 01/04/2023    Obesity (BMI 30-39.9) 01/04/2023       SURGICAL HISTORY   has a past surgical history that includes ankle arthroscopy (Right).    ALLERGIES  No Known Allergies    CURRENT MEDICATIONS  albuterol Aers  Fenofibrate Caps  lisinopril-hydrochlorothiazide  meloxicam Tabs  metFORMIN Tabs  methylPREDNISolone Tbpk    SOCIAL HISTORY  Social History  "    Tobacco Use    Smoking status: Never    Smokeless tobacco: Never   Vaping Use    Vaping status: Never Used   Substance and Sexual Activity    Alcohol use: Never    Drug use: Never    Sexual activity: Not Currently     Partners: Male       FAMILY HISTORY  Family History   Problem Relation Age of Onset    Hypertension Mother     Cancer Father     Lung Cancer Father         asbestos    Obesity Sister     Diabetes Sister     Heart Failure Brother     No Known Problems Maternal Grandmother     No Known Problems Maternal Grandfather     No Known Problems Paternal Grandmother     No Known Problems Paternal Grandfather     No Known Problems Daughter     No Known Problems Son           Objective:   PHYSICAL EXAM  VITAL SIGNS: BP (!) 140/82   Pulse (!) 110   Temp 36.5 °C (97.7 °F) (Temporal)   Resp 16   Ht 1.6 m (5' 3\")   Wt 91.2 kg (201 lb)   LMP 2013 (Within Months)   SpO2 95%   BMI 35.61 kg/m²     Gen: no acute distress, normal voice  Skin: dry, intact, moist mucosal membranes  Eyes: No conjunctival injection bilaterally.  Neck: Normal range of motion. No meningeal signs.   Lungs: No increased work of breathing.  Lungs were tight wheezing.  No rhonchi or crackles.    CV: Sinus tachycardia w/o murmurs or clicks  Psych: normal affect, normal judgement, alert, awake    EC2024  103 bpm.  Sinus tachycardia  Normal axis  No P wave abnormalities  No T wave abnormalities  No ST segment elevation or depression  No previous ECGs for comparison      Assessment/Plan:     1. SOB (shortness of breath)  EKG - Clinic Performed      2. Deaf, nonspeaking        3. Tachycardia        Suspect symptoms secondary to asthma exacerbation, and also suffering side effect of the steroids.  However she is complaining of \"fluttering\" and is objectively tachycardic + shortness of breath.  VTE is within the differentials therefore she was sent to the ED for further evaluation.  Patient and  understood everything " discussed and all questions were answered.   will be driving her.  Transfer center was contacted.        Please note that this dictation was created using voice recognition software. I have made a reasonable attempt to correct obvious errors, but I expect that there are errors of grammar and possibly content that I did not discover before finalizing the note.

## 2024-09-17 NOTE — ED TRIAGE NOTES
Pt comes in w/   was sent here from  for c/o CP and irregular heart rate  that she noticed started today   this is new for pt   she is just getting over illness 2 weeks ago and has been on steroids  pt in NAD at this time  EKG done in triage

## 2024-09-17 NOTE — ED PROVIDER NOTES
ED Provider Note    CHIEF COMPLAINT  Chief Complaint   Patient presents with    Irregular Heart Beat    Chest Pain     Was sent here from  for CP and irregular heart beat  no Hx of such        EXTERNAL RECORDS REVIEWED  Reviewed the note completed today by Michael Lizama for tachycardia and shortness of breath.    HPI/ROS    OUTSIDE HISTORIAN(S):  Patient's  is at bedside and the patient is deaf therefore is interpreting with sign language.    Montserrat Harris is a 59 y.o. female who presents with her  with complaint of palpitations.  She states that she has had palpitations that started earlier this morning woke up from her sleep.  Any chest pain at a point time just felt heartbeat go normal and then extra beats and normal again.  This slightly fast that time.  She not check her pulse at that point.  She went to urgent care and was instructed to come here as the patient did have an abnormal EKG yet was not brought with her.  Patient Nuys chest pain, fever, shakes, chills, cough, any other associated symptoms.  Does not drink alcohol, smoke cigarettes, drugs, no history of coronary disease in the past, no diabetes, no history of pulm embolism or DVTs.  PAST MEDICAL HISTORY   has a past medical history of Allergy, Asthma, Fibroid, Hypertension, Migraine, and Prediabetes.    SURGICAL HISTORY   has a past surgical history that includes ankle arthroscopy (Right).    FAMILY HISTORY  Family History   Problem Relation Age of Onset    Hypertension Mother     Cancer Father     Lung Cancer Father         asbestos    Obesity Sister     Diabetes Sister     Heart Failure Brother     No Known Problems Maternal Grandmother     No Known Problems Maternal Grandfather     No Known Problems Paternal Grandmother     No Known Problems Paternal Grandfather     No Known Problems Daughter     No Known Problems Son        SOCIAL HISTORY  Social History     Tobacco Use    Smoking status: Never    Smokeless tobacco: Never  "  Vaping Use    Vaping status: Never Used   Substance and Sexual Activity    Alcohol use: Never    Drug use: Never    Sexual activity: Not Currently     Partners: Male       CURRENT MEDICATIONS  Home Medications       Reviewed by Alaina Rangel (Pharmacy Tech) on 09/17/24 at 1424  Med List Status: Complete     Medication Last Dose Status   albuterol 108 (90 Base) MCG/ACT Aero Soln inhalation aerosol > 2 weeks Active   Fenofibrate 150 MG Cap 9/17/2024 Active   ibuprofen (MOTRIN) 200 MG Tab > 2 weeks Active   lisinopril-hydrochlorothiazide (PRINZIDE) 10-12.5 MG per tablet 9/17/2024 Active   meloxicam (MOBIC) 7.5 MG Tab 9/16/2024 Active   metFORMIN (GLUCOPHAGE) 500 MG Tab 9/17/2024 Active   methylPREDNISolone (MEDROL DOSEPAK) 4 MG Tablet Therapy Pack 9/15/2024 Active                  Audit from Redirected Encounters    **Home medications have not yet been reviewed for this encounter**         ALLERGIES  No Known Allergies    PHYSICAL EXAM  VITAL SIGNS: BP (!) 157/70   Pulse 91   Temp 36.4 °C (97.6 °F) (Temporal)   Resp 18   Ht 1.6 m (5' 3\")   Wt 91.2 kg (201 lb 1 oz)   LMP 01/01/2013 (Within Months)   SpO2 95%   BMI 35.62 kg/m²      Nursing notes and vitals reviewed.  Constitutional: Well developed, Well nourished, No acute distress, Non-toxic appearance.   Eyes: PERRLA, EOMI, Conjunctiva normal, No discharge.   Cardiovascular: Normal heart rate, Normal rhythm, No murmurs, No rubs, No gallops.   Thorax & Lungs: No respiratory distress, No rales, No rhonchi, No wheezing, No chest tenderness.   Abdomen: Bowel sounds normal, Soft, No tenderness, No guarding, No rebound, No masses, No pulsatile masses.   Skin: Warm, Dry, No erythema, No rash.   Extremities: No deformity, no pedal edema, good range of motion range of motion upper lower extremes bilaterally        EKG/LABS  Normal sinus rhythm on monitor  I have independently interpreted this EKG  Results for orders placed or performed during the hospital " encounter of 09/17/24   CBC with Differential   Result Value Ref Range    WBC 8.0 4.8 - 10.8 K/uL    RBC 5.13 4.20 - 5.40 M/uL    Hemoglobin 15.1 12.0 - 16.0 g/dL    Hematocrit 46.8 37.0 - 47.0 %    MCV 91.2 81.4 - 97.8 fL    MCH 29.4 27.0 - 33.0 pg    MCHC 32.3 32.2 - 35.5 g/dL    RDW 42.3 35.9 - 50.0 fL    Platelet Count 277 164 - 446 K/uL    MPV 11.5 9.0 - 12.9 fL    Neutrophils-Polys 70.20 44.00 - 72.00 %    Lymphocytes 18.20 (L) 22.00 - 41.00 %    Monocytes 6.00 0.00 - 13.40 %    Eosinophils 4.50 0.00 - 6.90 %    Basophils 0.60 0.00 - 1.80 %    Immature Granulocytes 0.50 0.00 - 0.90 %    Nucleated RBC 0.00 0.00 - 0.20 /100 WBC    Neutrophils (Absolute) 5.62 1.82 - 7.42 K/uL    Lymphs (Absolute) 1.46 1.00 - 4.80 K/uL    Monos (Absolute) 0.48 0.00 - 0.85 K/uL    Eos (Absolute) 0.36 0.00 - 0.51 K/uL    Baso (Absolute) 0.05 0.00 - 0.12 K/uL    Immature Granulocytes (abs) 0.04 0.00 - 0.11 K/uL    NRBC (Absolute) 0.00 K/uL   Complete Metabolic Panel (CMP)   Result Value Ref Range    Sodium 137 135 - 145 mmol/L    Potassium 4.3 3.6 - 5.5 mmol/L    Chloride 101 96 - 112 mmol/L    Co2 23 20 - 33 mmol/L    Anion Gap 13.0 7.0 - 16.0    Glucose 134 (H) 65 - 99 mg/dL    Bun 33 (H) 8 - 22 mg/dL    Creatinine 1.13 0.50 - 1.40 mg/dL    Calcium 9.6 8.4 - 10.2 mg/dL    Correct Calcium 9.4 8.5 - 10.5 mg/dL    AST(SGOT) 22 12 - 45 U/L    ALT(SGPT) 18 2 - 50 U/L    Alkaline Phosphatase 42 30 - 99 U/L    Total Bilirubin 0.3 0.1 - 1.5 mg/dL    Albumin 4.3 3.2 - 4.9 g/dL    Total Protein 7.3 6.0 - 8.2 g/dL    Globulin 3.0 1.9 - 3.5 g/dL    A-G Ratio 1.4 g/dL   Troponins NOW   Result Value Ref Range    Troponin T 13 6 - 19 ng/L   Troponins in two (2) hours   Result Value Ref Range    Troponin T 12 6 - 19 ng/L   MAGNESIUM   Result Value Ref Range    Magnesium 1.8 1.5 - 2.5 mg/dL   TSH WITH REFLEX TO FT4   Result Value Ref Range    TSH 2.400 0.380 - 5.330 uIU/mL   D-DIMER   Result Value Ref Range    D-Dimer <0.27 0.00 - 0.50 ug/mL (FEU)    ESTIMATED GFR   Result Value Ref Range    GFR (CKD-EPI) 56 (A) >60 mL/min/1.73 m 2   EKG   Result Value Ref Range    Report       [facility] Emergency Dept.    Test Date:  2024  Pt Name:    ELIZABETH GOODRICH             Department: [facility]  MRN:        [id number]                      Room:  Gender:     Female                       Technician: [id number]  :        1965                   Requested By:ER TRIAGE PROTOCOL  Order #:    [id number]                    Reading MD: JOSE ROSARIO DO    Measurements  Intervals                                Axis  Rate:       97                           P:          67  ME:         162                          QRS:        45  QRSD:       84                           T:          70  QT:         333  QTc:        423    Interpretive Statements  Sinus rhythm  Probable left atrial enlargement  Low voltage, precordial leads  No previous ECG available for comparison  Electronically Signed On 2024 12:36:26 PDT by JOSE ROSARIO DO         RADIOLOGY/PROCEDURES   I have independently interpreted the diagnostic imaging associated with this visit and am waiting the final reading from the radiologist.   My preliminary interpretation is as follows: Chest x-ray is negative for infiltrate, pulmonary edema    Radiologist interpretation:  DX-CHEST-PORTABLE (1 VIEW)   Final Result      No acute cardiac or pulmonary abnormalities are identified.          COURSE & MEDICAL DECISION MAKING    ASSESSMENT, COURSE AND PLAN  Care Narrative: This is a charming 59-year-old female presents with a heart score of 2 presents with arrhythmia.  EKG shows normal sinus rhythm slightly high at 97 bpm.  On my evaluation, the patient heart rate jumped from 95 bpm to 101 bpm.  She has no symptoms whatsoever just feels palpitations.  She denies chest pain currently only associated symptoms.  Initial troponin is negative, chest x-ray shows no pulm edema or significant  abnormality.  We do a delta troponin and if negative I do believe the patient will be able to be sent to her primary care physician for Zio patch with strict return precautions.  At this point she does not have a deadly arrhythmia such as prolonged QTc, Brugada syndrome, prolonged OR, high-grade AV block.  The patient has sinus rhythm and I do believe she is amenable for outpatient evaluation and management.  She did receive 1 L normal saline IV fluid bolus secondary to the fact that she was intermittently presenting with sinus tachycardia.  She responded appropriate IV fluids and does have a normal heart rate.    I did consider possible PE yet the patient has a normal D-dimer, she is not hypoxic, tachypneic she is slightly tachycardic.  Do not leave she has a pulmonary embolism based on criteria.  She is possibly having exam is activation, read the note from the outlying facility it sounds like that the patient was taken off steroids.  At this point would not start her back on steroids and asked that she wait and see what happens.  She will need further but radiation and possible monitoring.          DISPOSITION AND DISCUSSIONS      Decision tools and prescription drugs considered including, but not limited to: Patient has a negative D-dimer, low pretest probably for pulmonary embolism therefore CT pulmonary angio was not completed.    FINAL DIAGNOSIS  1. Palpitations          DISPOSITION:  Patient will be discharged home in stable condition.    FOLLOW UP:  [facility], Emergency Dept  [address] [facility] [address]  [phone]        YURI Durham.RMichelleN.  [address]  [place] [id number] [phone]    Schedule an appointment as soon as possible for a visit   You will need a Zio patch      OUTPATIENT MEDICATIONS:  New Prescriptions    No medications on file          Electronically signed by: Yong Rojo D.O., 9/17/2024 12:46 PM

## 2024-09-19 ENCOUNTER — OFFICE VISIT (OUTPATIENT)
Dept: MEDICAL GROUP | Facility: PHYSICIAN GROUP | Age: 59
End: 2024-09-19
Payer: COMMERCIAL

## 2024-09-19 VITALS
DIASTOLIC BLOOD PRESSURE: 68 MMHG | BODY MASS INDEX: 35.15 KG/M2 | SYSTOLIC BLOOD PRESSURE: 104 MMHG | HEIGHT: 63 IN | TEMPERATURE: 98.1 F | OXYGEN SATURATION: 94 % | WEIGHT: 198.4 LBS | HEART RATE: 93 BPM

## 2024-09-19 DIAGNOSIS — R00.2 PALPITATIONS: ICD-10-CM

## 2024-09-19 DIAGNOSIS — Z23 NEED FOR VACCINATION: ICD-10-CM

## 2024-09-19 DIAGNOSIS — R01.1 CARDIAC MURMUR: ICD-10-CM

## 2024-09-19 DIAGNOSIS — J45.20 MILD INTERMITTENT ASTHMA WITHOUT COMPLICATION: ICD-10-CM

## 2024-09-19 ASSESSMENT — FIBROSIS 4 INDEX: FIB4 SCORE: 1.1

## 2024-09-19 NOTE — LETTER
September 19, 2024         Patient: Montserrat Harris   YOB: 1965   Date of Visit: 9/19/2024           To Whom it May Concern:    Montserrat Harris was seen in my clinic on 9/19/2024. She may return to work on 09/24/24.    If you have any questions or concerns, please don't hesitate to call.        Sincerely,           RIGO Durham.  Electronically Signed

## 2024-09-22 ASSESSMENT — ENCOUNTER SYMPTOMS
CHEST TIGHTNESS: 1
PALPITATIONS: 1
SHORTNESS OF BREATH: 1

## 2024-09-23 NOTE — PROGRESS NOTES
"Verbal consent was acquired by the patient to use [employer] ambient listening note generation during this visit Yes      Subjective   Montserrat Harris is a 59 y.o. female who presents for:  History of Present Illness  The patient presents for evaluation of multiple medical concerns. She is accompanied by her .    She visited the emergency room on Tuesday due to unusual chest sensations. On Tuesday, she experienced chest pain, and her blood pressure was recorded as 184/84. The emergency room doctor recommended a Zio patch and advised her to schedule an appointment with PCP. She reported heartburn and palpitations, which subsided after receiving intravenous treatment. Since her hospital visit, she has not experienced any further palpitations. She also mentioned waking up at midnight the previous night and having difficulty falling back asleep.    She had been on a two-week course of steroids, which concluded on Sunday. She was prescribed steroids for her asthma. Two weeks prior, she was using albuterol every four hours before starting the steroid pack. She finds it challenging to use the spacer correctly and believes that a nebulizer would be more effective for her. She was diagnosed with asthma in 1997 by Dr. Ruiz at [facility] in [place]. Her asthma symptoms have worsened since then.     ALLERGIES  She is allergic to RAGWEED.    Review of Systems   Respiratory:  Positive for chest tightness and shortness of breath.    Cardiovascular:  Positive for palpitations.   All other systems reviewed and are negative.    Objective   /68 (BP Location: Right arm, Patient Position: Sitting, BP Cuff Size: Adult)   Pulse 93   Temp 36.7 °C (98.1 °F) (Temporal)   Ht 1.6 m (5' 3\")   Wt 90 kg (198 lb 6.4 oz)   SpO2 94%   Physical Exam  General: Normal appearing. No distress.  HEENT: Normocephalic. Eyes conjunctiva clear lids without ptosis, pupils equal and reactive to light accommodation, ears normal shape and " contour.  Pulmonary: Clear to ausculation.  Normal effort. No rales, rhonchi, or wheezing.  Cardiovascular: 2/6 systolic murmur. Regular rate and rhythm. Carotid and radial pulses are intact and equal bilaterally.  Abdomen:  Soft, nontender, nondistended. Normal bowel sounds.   Neurologic: Grossly nonfocal.  Skin: Warm and dry.  No obvious lesions.  Musculoskeletal: Normal gait. No extremity cyanosis, clubbing, or edema.  Psych: Normal mood and affect. Alert and oriented x3. Judgment and insight is normal.     Assessment & Plan  1. Palpitations  2. Cardiac murmur  New to examiner. She experienced chest tightness and palpitations, which resolved after receiving IV fluids, suggesting dehydration and stress as potential contributors. She had also been taking steroids for acute asthma exacerbation. A heart murmur was detected during the examination. A referral to Cardiology will be made for further evaluation and treatment.  - REFERRAL TO CARDIOLOGY    3. Mild intermittent asthma without complication  Chronic, ongoing. She has difficulty using her albuterol inhaler correctly. A nebulizer with albuterol will be ordered to improve medication delivery. Durable medical equipment for the nebulizer and solution will also be ordered. If the nebulizer does not provide relief, alternative medications will be considered. Continue albuterol inhaler as prescribed.     4. Need for vaccination  Given today.  - INFLUENZA VACCINE QUAD INJ (PF)     Return in about 27 weeks (around 3/27/2025) for Preventative Annual, Follow up Labs, As needed.     I have placed the below orders and discussed them with an approved delegating provider. The MA is performing the below orders under the direction of Dr. Allen.      Please note that this dictation was created using voice recognition software. I have made every reasonable attempt to correct obvious errors, but I expect that there are errors of grammar and possibly content that I did not discover  before finalizing the note.

## 2024-10-14 DIAGNOSIS — M76.62 ACHILLES TENDINITIS, LEFT LEG: ICD-10-CM

## 2024-10-15 RX ORDER — MELOXICAM 7.5 MG/1
7.5 TABLET ORAL DAILY
Qty: 90 TABLET | Refills: 1 | Status: SHIPPED | OUTPATIENT
Start: 2024-10-15

## 2024-10-19 DIAGNOSIS — I10 PRIMARY HYPERTENSION: ICD-10-CM

## 2024-10-21 ENCOUNTER — TELEPHONE (OUTPATIENT)
Dept: URGENT CARE | Facility: PHYSICIAN GROUP | Age: 59
End: 2024-10-21
Payer: COMMERCIAL

## 2024-10-21 DIAGNOSIS — E78.5 DYSLIPIDEMIA: ICD-10-CM

## 2024-10-21 DIAGNOSIS — R73.03 PRE-DIABETES: ICD-10-CM

## 2024-10-21 RX ORDER — FENOFIBRATE 150 MG/1
150 CAPSULE ORAL DAILY
Qty: 90 CAPSULE | Refills: 0 | Status: SHIPPED | OUTPATIENT
Start: 2024-10-21

## 2024-10-22 RX ORDER — LISINOPRIL AND HYDROCHLOROTHIAZIDE 10; 12.5 MG/1; MG/1
1 TABLET ORAL DAILY
Qty: 90 TABLET | Refills: 0 | Status: SHIPPED | OUTPATIENT
Start: 2024-10-22

## 2024-10-24 NOTE — PROGRESS NOTES
Medical decision making:  -      Problem list:  There are no diagnoses linked to this encounter.       Chief Complaint: No chief complaint on file.      History of Present Illness:  Montserrat Harris is a 59 y.o. female who is referred by YESSICA Henson for palpitations.     Mrs. Harris communicates with sign language.  We have an .    No prior hypertension.  No prior hyperlipidemia.  No family history of premature coronary artery disease.  No prior smoking history.  No history of diabetes.  No history of autoimmune disease such as lupus or rheumatoid arthritis.  No history of chest radiation or cardiotoxic chemotherapy.  No chronic kidney disease.  No ETOH overuse.  No caffeine overuse.  No recreation substance use.  No hx asthma.  Covid,    Not limited by chest pain, pressure or tightness with activity.  No significant dyspnea on exertion, orthopnea or lower extremity swelling.  No significant palpitations, lightheadedness, or presyncope/syncope.  No symptoms of leg claudication.  No stroke/TIA like symptoms.    Lives in [place].   to [place]o.    Wt Readings from Last 5 Encounters:   09/19/24 90 kg (198 lb 6.4 oz)   09/17/24 91.2 kg (201 lb 1 oz)   09/17/24 91.2 kg (201 lb)   09/10/24 90.7 kg (200 lb)   07/31/24 92.1 kg (203 lb)       DATA REVIEWED by me:  ECG (my personal interpretation) 9/17/2024 sinus, 97    Echo    Most recent labs:       Lab Results   Component Value Date/Time    WBC 8.0 09/17/2024 12:52 PM    HEMOGLOBIN 15.1 09/17/2024 12:52 PM    HEMATOCRIT 46.8 09/17/2024 12:52 PM    MCV 91.2 09/17/2024 12:52 PM      Lab Results   Component Value Date/Time    SODIUM 137 09/17/2024 12:52 PM    POTASSIUM 4.3 09/17/2024 12:52 PM    CHLORIDE 101 09/17/2024 12:52 PM    CO2 23 09/17/2024 12:52 PM    GLUCOSE 134 (H) 09/17/2024 12:52 PM    BUN 33 (H) 09/17/2024 12:52 PM    CREATININE 1.13 09/17/2024 12:52 PM      Lab Results   Component Value Date/Time    ASTSGOT 22 09/17/2024 12:52  "PM    ALTSGPT 18 09/17/2024 12:52 PM    ALBUMIN 4.3 09/17/2024 12:52 PM      Lab Results   Component Value Date/Time    CHOLSTRLTOT 158 05/17/2024 07:41 AM    LDL 87 05/17/2024 07:41 AM    HDL 51 05/17/2024 07:41 AM    TRIGLYCERIDE 101 05/17/2024 07:41 AM     No results for input(s): \"NTPROBNP\", \"TROPONINT\" in the last 72 hours.      Past Medical History:   Diagnosis Date    Allergy     Asthma     Fibroid     Hypertension     Migraine     Prediabetes      Past Surgical History:   Procedure Laterality Date    ANKLE ARTHROSCOPY Right     when a child     Family History   Problem Relation Age of Onset    Hypertension Mother     Cancer Father     Lung Cancer Father         asbestos    Obesity Sister     Diabetes Sister     Heart Failure Brother     No Known Problems Maternal Grandmother     No Known Problems Maternal Grandfather     No Known Problems Paternal Grandmother     No Known Problems Paternal Grandfather     No Known Problems Daughter     No Known Problems Son      Social History     Socioeconomic History    Marital status:      Spouse name: Gallo    Number of children: 2    Years of education: Not on file    Highest education level: 12th grade   Occupational History    Not on file   Tobacco Use    Smoking status: Never    Smokeless tobacco: Never   Vaping Use    Vaping status: Never Used   Substance and Sexual Activity    Alcohol use: Never    Drug use: Never    Sexual activity: Not Currently     Partners: Male   Other Topics Concern    Not on file   Social History Narrative    Not on file     Social Determinants of Health     Financial Resource Strain: Low Risk  (2/11/2024)    Overall Financial Resource Strain (CARDIA)     Difficulty of Paying Living Expenses: Not hard at all   Food Insecurity: No Food Insecurity (2/11/2024)    Hunger Vital Sign     Worried About Running Out of Food in the Last Year: Never true     Ran Out of Food in the Last Year: Never true   Transportation Needs: Unmet " Transportation Needs (2/11/2024)    PRAPARE - Transportation     Lack of Transportation (Medical): Yes     Lack of Transportation (Non-Medical): No   Physical Activity: Insufficiently Active (3/24/2024)    Exercise Vital Sign     Days of Exercise per Week: 7 days     Minutes of Exercise per Session: 10 min   Stress: No Stress Concern Present (3/24/2024)    [employer] [place] [facility] [employer] [facility] - Occupational Stress Questionnaire     Feeling of Stress : Not at all   Recent Concern: Stress - Stress Concern Present (2/11/2024)    [facility] - Occupational Stress Questionnaire     Feeling of Stress : Very much   Social Connections: Unknown (3/24/2024)    Social Connection and Isolation Panel [NHANES]     Frequency of Communication with Friends and Family: Patient declined     Frequency of Social Gatherings with Friends and Family: Patient declined     Attends Faith Services: Patient declined     Active Member of Clubs or Organizations: No     Attends Club or Organization Meetings: Never     Marital Status:    Recent Concern: Social Connections - Socially Isolated (2/11/2024)    Social Connection and Isolation Panel [NHANES]     Frequency of Communication with Friends and Family: Once a week     Frequency of Social Gatherings with Friends and Family: Never     Attends Faith Services: Never     Active Member of Clubs or Organizations: No     Attends Club or Organization Meetings: Never     Marital Status:    Intimate Partner Violence: Not on file   Housing Stability: Low Risk  (3/24/2024)    Housing Stability Vital Sign     Unable to Pay for Housing in the Last Year: No     Number of Places Lived in the Last Year: 0     Unstable Housing in the Last Year: No     No Known Allergies    Current Outpatient Medications   Medication Sig Dispense Refill    lisinopril-hydrochlorothiazide (PRINZIDE) 10-12.5 MG per tablet Take 1 Tablet by mouth every day. 90 Tablet 0     metFORMIN (GLUCOPHAGE) 500 MG Tab Take 1 Tablet by mouth every day. 90 Tablet 0    Fenofibrate 150 MG Cap Take 1 Capsule by mouth every day. 90 Capsule 0    meloxicam (MOBIC) 7.5 MG Tab Take 1 Tablet by mouth every day. 90 Tablet 1    albuterol 108 (90 Base) MCG/ACT Aero Soln inhalation aerosol Inhale 2 Puffs every four hours as needed for Shortness of Breath. 1 Each 11     No current facility-administered medications for this visit.       ROS  All others systems reviewed and negative.    There were no vitals taken for this visit. There is no height or weight on file to calculate BMI.    General: No acute distress. Well nourished.  HEENT: EOM grossly intact, no scleral icterus, no pharyngeal erythema.   Neck:  No JVD, no bruits, trachea midline  CVS: RRR. Normal S1, S2. No M/R/G. No LE edema.  2+ radial pulses, 2+ PT pulses  Resp: CTAB. No wheezing or crackles/rhonchi. Normal respiratory effort.  Abdomen: Soft, NT, no juliana hepatomegaly.  MSK/Ext: No clubbing or cyanosis.  Skin: Warm and dry, no rashes.  Neurological: CN III-XII grossly intact. No focal deficits.   Psych: A&O x 3, appropriate affect, good judgement      No follow-ups on file.    Written instructions given today:    ***    It is my pleasure to participate in the care of Ms. Harris.  Please do not hesitate to contact me with questions or concerns.    Mellissa Kim MD, [facility]  Cardiologist, [facility]    Please note that this dictation was created using voice recognition software. I have made every reasonable attempt to correct obvious errors, but it is possible there are errors of grammar and possibly content that I did not discover before finalizing the note.   "mouth every day. 90 Capsule 3    atorvastatin (LIPITOR) 10 MG Tab Take 1 Tablet by mouth every evening. 90 Tablet 3    metFORMIN (GLUCOPHAGE) 500 MG Tab Take 1 Tablet by mouth every day. 90 Tablet 0    meloxicam (MOBIC) 7.5 MG Tab Take 1 Tablet by mouth every day. 90 Tablet 1    albuterol 108 (90 Base) MCG/ACT Aero Soln inhalation aerosol Inhale 2 Puffs every four hours as needed for Shortness of Breath. 1 Each 11     No current facility-administered medications for this visit.     ROS  All others systems reviewed and negative.    /62 (BP Location: Left arm, Patient Position: Sitting, BP Cuff Size: Adult)   Pulse 86   Resp 16   Ht 1.6 m (5' 3\")   Wt 86.2 kg (190 lb)   SpO2 96%  Body mass index is 33.66 kg/m².    General: No acute distress. Well nourished.  HEENT: EOM grossly intact, no scleral icterus, no pharyngeal erythema.   Neck:  No JVD, no bruits, trachea midline  CVS: RRR. Normal S1, S2.  2/6 systolic murmur right and left sternal border.  No LE edema.  2+ radial pulses, 2+ PT pulses  Resp: CTAB. No wheezing or crackles/rhonchi. Normal respiratory effort.  Abdomen: Soft, NT, no juliana hepatomegaly.  MSK/Ext: No clubbing or cyanosis.  Skin: Warm and dry, no rashes.  Neurological:  No focal deficits.  Congenitally deaf.  Psych: A&O x 3, appropriate affect, good judgement, sending appropriately with .      Return in about 4 weeks (around 11/29/2024).    Written instructions given today:      *Heart murmur means heart sound.  The murmur is very mild.  Echocardiogram will show us a baseline of how the heart valves are doing so we can be sure that the murmur is benign and we have a baseline in case the murmur changes in the future.    -Lisinopril-HCTZ 10-12.5 mg is 1 pill with 2 medications.  Lisinopril 10 mg and hydrochlorothiazide 12.5 mg.  -I am concerned the lisinopril is causing you to cough.  I want to switch the lisinopril to losartan 25 mg once daily.  -Lisinopril 10 mg equals losartan " 25 mg.  -If lisinopril is causing the cough, it should get better in 1 to 2 weeks.  -You also have a separate pill for HCTZ 12.5 mg    -If you feel anything abnormal in the chest again, we can have a heart monitor placed.  A Zio patch heart monitor goes on the chest like a giant Band-Aid and you can wear it for up to 2 weeks.  You can also shower but you cannot submerge into water such as a bathtub or swimming pool.  Call the office if you have heart palpitations or funny heartbeats and we can get this ordered.    *Patients with diabetes have a lower risk of heart attack and stroke when you take a class of medications known as the statins.  These are preferable over your fenofibrate.    -Stop your fenofibrate.  -Start atorvastatin 10 mg once daily.  Report back if you get achy muscles or joints while taking this medication.    It is my pleasure to participate in the care of Ms. Harris.  Please do not hesitate to contact me with questions or concerns.    Mellissa Kim MD, [facility]  Cardiologist, [facility]    Please note that this dictation was created using voice recognition software. I have made every reasonable attempt to correct obvious errors, but it is possible there are errors of grammar and possibly content that I did not discover before finalizing the note.

## 2024-11-01 ENCOUNTER — APPOINTMENT (OUTPATIENT)
Dept: CARDIOLOGY | Facility: MEDICAL CENTER | Age: 59
End: 2024-11-01
Attending: NURSE PRACTITIONER
Payer: COMMERCIAL

## 2024-11-01 VITALS
OXYGEN SATURATION: 96 % | RESPIRATION RATE: 16 BRPM | HEIGHT: 63 IN | HEART RATE: 86 BPM | SYSTOLIC BLOOD PRESSURE: 118 MMHG | DIASTOLIC BLOOD PRESSURE: 62 MMHG | BODY MASS INDEX: 33.66 KG/M2 | WEIGHT: 190 LBS

## 2024-11-01 DIAGNOSIS — J45.20 MILD INTERMITTENT ASTHMA WITHOUT COMPLICATION: ICD-10-CM

## 2024-11-01 DIAGNOSIS — E11.9 CONTROLLED TYPE 2 DIABETES MELLITUS WITHOUT COMPLICATION, WITHOUT LONG-TERM CURRENT USE OF INSULIN (HCC): ICD-10-CM

## 2024-11-01 DIAGNOSIS — N18.31 STAGE 3A CHRONIC KIDNEY DISEASE: ICD-10-CM

## 2024-11-01 DIAGNOSIS — R01.1 UNDIAGNOSED CARDIAC MURMURS: ICD-10-CM

## 2024-11-01 DIAGNOSIS — R00.2 PALPITATIONS: ICD-10-CM

## 2024-11-01 DIAGNOSIS — I10 PRIMARY HYPERTENSION: ICD-10-CM

## 2024-11-01 DIAGNOSIS — H91.3 DEAF, NONSPEAKING: ICD-10-CM

## 2024-11-01 DIAGNOSIS — E78.1 HYPERTRIGLYCERIDEMIA: ICD-10-CM

## 2024-11-01 PROCEDURE — 99211 OFF/OP EST MAY X REQ PHY/QHP: CPT | Performed by: INTERNAL MEDICINE

## 2024-11-01 PROCEDURE — 3078F DIAST BP <80 MM HG: CPT | Performed by: INTERNAL MEDICINE

## 2024-11-01 PROCEDURE — 99204 OFFICE O/P NEW MOD 45 MIN: CPT | Performed by: INTERNAL MEDICINE

## 2024-11-01 PROCEDURE — 3074F SYST BP LT 130 MM HG: CPT | Performed by: INTERNAL MEDICINE

## 2024-11-01 RX ORDER — ATORVASTATIN CALCIUM 10 MG/1
10 TABLET, FILM COATED ORAL NIGHTLY
Qty: 90 TABLET | Refills: 3 | Status: SHIPPED | OUTPATIENT
Start: 2024-11-01

## 2024-11-01 RX ORDER — LOSARTAN POTASSIUM 25 MG/1
25 TABLET ORAL DAILY
Qty: 90 TABLET | Refills: 3 | Status: SHIPPED | OUTPATIENT
Start: 2024-11-01

## 2024-11-01 RX ORDER — HYDROCHLOROTHIAZIDE 12.5 MG/1
12.5 CAPSULE ORAL DAILY
Qty: 90 CAPSULE | Refills: 3 | Status: SHIPPED | OUTPATIENT
Start: 2024-11-01

## 2024-11-01 ASSESSMENT — FIBROSIS 4 INDEX: FIB4 SCORE: 1.1

## 2024-11-01 NOTE — PATIENT INSTRUCTIONS
*Heart murmur means heart sound.  The murmur is very mild.  Echocardiogram will show us a baseline of how the heart valves are doing so we can be sure that the murmur is benign and we have a baseline in case the murmur changes in the future.    -Lisinopril-HCTZ 10-12.5 mg is 1 pill with 2 medications.  Lisinopril 10 mg and hydrochlorothiazide 12.5 mg.  -I am concerned the lisinopril is causing you to cough.  I want to switch the lisinopril to losartan 25 mg once daily.  -Lisinopril 10 mg equals losartan 25 mg.  -If lisinopril is causing the cough, it should get better in 1 to 2 weeks.  -You also have a separate pill for HCTZ 12.5 mg    -If you feel anything abnormal in the chest again, we can have a heart monitor placed.  A Zio patch heart monitor goes on the chest like a giant Band-Aid and you can wear it for up to 2 weeks.  You can also shower but you cannot submerge into water such as a bathtub or swimming pool.  Call the office if you have heart palpitations or funny heartbeats and we can get this ordered.    *Patients with diabetes have a lower risk of heart attack and stroke when you take a class of medications known as the statins.  These are preferable over your fenofibrate.    -Stop your fenofibrate.  -Start atorvastatin 10 mg once daily.  Report back if you get achy muscles or joints while taking this medication.

## 2024-11-01 NOTE — LETTER
[facility]   [address]   [address]  [place] [address]  Phone: [phone]  Fax: [phone]              Montserrat Harris  1965    Encounter Date: 11/1/2024    Mellissa Kim M.D.          PROGRESS NOTE:        Medical decision making:  -A few things to address today  -In terms of palpitations and slower heart rate, reassurance.  If she has recurrence of symptoms, we can easily get a Zio patch heart monitor.  -Echocardiogram for systolic murmur which is likely aortic valve sclerosis.  -Given type 2 diabetes, be ideal for her to be on a statin instead of fenofibrate.  The statins can also treat triglycerides.  I am switching her fenofibrate to atorvastatin and told her to watch for myalgias.  -She was not aware she is on a combination pill with lisinopril.  She did is coughing which could be her asthma but could be lisinopril.  I am changing her lisinopril to losartan and breaking the pills apart.  -RTC back in our clinic in about 4 to 6 weeks to see how she is doing with the medication changes.      Problem list:  1. Palpitations    2. Primary hypertension  - losartan (COZAAR) 25 MG Tab; Take 1 Tablet by mouth every day.  Dispense: 90 Tablet; Refill: 3  - hydrochlorothiazide (MICROZIDE) 12.5 MG capsule; Take 1 Capsule by mouth every day.  Dispense: 90 Capsule; Refill: 3    3. Deaf, nonspeaking    4. Mild intermittent asthma without complication    5. Stage 3a chronic kidney disease    6. Controlled type 2 diabetes mellitus without complication, without long-term current use of insulin (HCC)    7. Hypertriglyceridemia  - atorvastatin (LIPITOR) 10 MG Tab; Take 1 Tablet by mouth every evening.  Dispense: 90 Tablet; Refill: 3    8. Undiagnosed cardiac murmurs  - EC-ECHOCARDIOGRAM COMPLETE W/O CONT; Future       Chief Complaint:   Chief Complaint   Patient presents with   • Hypertension   •  Dyslipidemia       History of Present Illness:  Montserrat Harris is a 59 y.o. female who is referred by YESSICA Henson for palpitations, shortness of breath, HTN, HLP, DM2, asthma.     Mrs. Harris communicates with sign language.  We have an  via [employer].    In September, noted heart rate was beating slowly.  Evaluated in the emergency room and referred to cardiology.  No recurrence of any palpitations.    Has asthma, with inhalers.   Can gets some shortness of breath, coughing with allergies and asthma.   Does keep her from being active.     HTN, controlled.  Has been on lisinopril.    HLP, on fenofibrate.   LDL 87, .  She does not recall ever trying a statin.    DM2, on oral med only. Controlled.     CKD 3a.     Systolic murmur on exam today.    No prior smoking history.  No history of autoimmune disease such as lupus or rheumatoid arthritis.  No history of chest radiation or cardiotoxic chemotherapy.  No chronic kidney disease.  No ETOH overuse.  No caffeine overuse.  No recreation substance use.    Not limited by chest pain, pressure or tightness with activity.  No significant orthopnea or lower extremity swelling.  No significant lightheadedness, or presyncope/syncope.  No symptoms of leg claudication.  No stroke/TIA like symptoms.    Lives in [place].   to Gallo who is here, he is hard of hearing and signs a bit but not fluent.     Wt Readings from Last 5 Encounters:   11/01/24 86.2 kg (190 lb)   09/19/24 90 kg (198 lb 6.4 oz)   09/17/24 91.2 kg (201 lb 1 oz)   09/17/24 91.2 kg (201 lb)   09/10/24 90.7 kg (200 lb)       DATA REVIEWED by me:  ECG (my personal interpretation) 9/17/2024 sinus, 97    Echo pending    Most recent labs:       Lab Results   Component Value Date/Time    WBC 8.0 09/17/2024 12:52 PM    HEMOGLOBIN 15.1 09/17/2024 12:52 PM    HEMATOCRIT 46.8 09/17/2024 12:52 PM    MCV 91.2 09/17/2024 12:52 PM      Lab Results   Component Value Date/Time    SODIUM 137  "09/17/2024 12:52 PM    POTASSIUM 4.3 09/17/2024 12:52 PM    CHLORIDE 101 09/17/2024 12:52 PM    CO2 23 09/17/2024 12:52 PM    GLUCOSE 134 (H) 09/17/2024 12:52 PM    BUN 33 (H) 09/17/2024 12:52 PM    CREATININE 1.13 09/17/2024 12:52 PM      Lab Results   Component Value Date/Time    ASTSGOT 22 09/17/2024 12:52 PM    ALTSGPT 18 09/17/2024 12:52 PM    ALBUMIN 4.3 09/17/2024 12:52 PM      Lab Results   Component Value Date/Time    CHOLSTRLTOT 158 05/17/2024 07:41 AM    LDL 87 05/17/2024 07:41 AM    HDL 51 05/17/2024 07:41 AM    TRIGLYCERIDE 101 05/17/2024 07:41 AM     No results for input(s): \"NTPROBNP\", \"TROPONINT\" in the last 72 hours.      Past Medical History:   Diagnosis Date   • Allergy    • Asthma    • Fibroid    • Hypertension    • Migraine    • Prediabetes      Past Surgical History:   Procedure Laterality Date   • ANKLE ARTHROSCOPY Right     when a child     Family History   Problem Relation Age of Onset   • Hypertension Mother    • Cancer Father    • Lung Cancer Father         asbestos   • Obesity Sister    • Diabetes Sister    • Heart Failure Brother    • No Known Problems Maternal Grandmother    • No Known Problems Maternal Grandfather    • No Known Problems Paternal Grandmother    • No Known Problems Paternal Grandfather    • No Known Problems Daughter    • No Known Problems Son      Social History     Socioeconomic History   • Marital status:      Spouse name: Gallo   • Number of children: 2   • Years of education: Not on file   • Highest education level: 12th grade   Occupational History   • Not on file   Tobacco Use   • Smoking status: Never   • Smokeless tobacco: Never   Vaping Use   • Vaping status: Never Used   Substance and Sexual Activity   • Alcohol use: Never   • Drug use: Never   • Sexual activity: Not Currently     Partners: Male   Other Topics Concern   • Not on file   Social History Narrative   • Not on file     Social Determinants of Health     Financial Resource Strain: Low Risk  " (2/11/2024)    Overall Financial Resource Strain (CARDIA)    • Difficulty of Paying Living Expenses: Not hard at all   Food Insecurity: No Food Insecurity (2/11/2024)    Hunger Vital Sign    • Worried About Running Out of Food in the Last Year: Never true    • Ran Out of Food in the Last Year: Never true   Transportation Needs: Unmet Transportation Needs (2/11/2024)    PRAPARE - Transportation    • Lack of Transportation (Medical): Yes    • Lack of Transportation (Non-Medical): No   Physical Activity: Insufficiently Active (3/24/2024)    Exercise Vital Sign    • Days of Exercise per Week: 7 days    • Minutes of Exercise per Session: 10 min   Stress: No Stress Concern Present (3/24/2024)    [employer] [place] [employer] - Occupational Stress Questionnaire    • Feeling of Stress : Not at all   Recent Concern: Stress - Stress Concern Present (2/11/2024)    [employer] [facility] [employer] [facility] - Occupational Stress Questionnaire    • Feeling of Stress : Very much   Social Connections: Unknown (3/24/2024)    Social Connection and Isolation Panel [NHANES]    • Frequency of Communication with Friends and Family: Patient declined    • Frequency of Social Gatherings with Friends and Family: Patient declined    • Attends [employer]ist Services: Patient declined    • Active Member of Clubs or Organizations: No    • Attends Club or Organization Meetings: Never    • Marital Status:    Recent Concern: Social Connections - Socially Isolated (2/11/2024)    Social Connection and Isolation Panel [NHANES]    • Frequency of Communication with Friends and Family: Once a week    • Frequency of Social Gatherings with Friends and Family: Never    • Attends Adventist Services: Never    • Active Member of Clubs or Organizations: No    • Attends Club or Organization Meetings: Never    • Marital Status:    Intimate Partner Violence: Not on file   Housing Stability: Low Risk  (3/24/2024)    Housing Stability Vital Sign  "   • Unable to Pay for Housing in the Last Year: No    • Number of Places Lived in the Last Year: 0    • Unstable Housing in the Last Year: No     No Known Allergies    Current Outpatient Medications   Medication Sig Dispense Refill   • losartan (COZAAR) 25 MG Tab Take 1 Tablet by mouth every day. 90 Tablet 3   • hydrochlorothiazide (MICROZIDE) 12.5 MG capsule Take 1 Capsule by mouth every day. 90 Capsule 3   • atorvastatin (LIPITOR) 10 MG Tab Take 1 Tablet by mouth every evening. 90 Tablet 3   • metFORMIN (GLUCOPHAGE) 500 MG Tab Take 1 Tablet by mouth every day. 90 Tablet 0   • meloxicam (MOBIC) 7.5 MG Tab Take 1 Tablet by mouth every day. 90 Tablet 1   • albuterol 108 (90 Base) MCG/ACT Aero Soln inhalation aerosol Inhale 2 Puffs every four hours as needed for Shortness of Breath. 1 Each 11     No current facility-administered medications for this visit.     ROS  All others systems reviewed and negative.    /62 (BP Location: Left arm, Patient Position: Sitting, BP Cuff Size: Adult)   Pulse 86   Resp 16   Ht 1.6 m (5' 3\")   Wt 86.2 kg (190 lb)   SpO2 96%  Body mass index is 33.66 kg/m².    General: No acute distress. Well nourished.  HEENT: EOM grossly intact, no scleral icterus, no pharyngeal erythema.   Neck:  No JVD, no bruits, trachea midline  CVS: RRR. Normal S1, S2.  2/6 systolic murmur right and left sternal border.  No LE edema.  2+ radial pulses, 2+ PT pulses  Resp: CTAB. No wheezing or crackles/rhonchi. Normal respiratory effort.  Abdomen: Soft, NT, no juliana hepatomegaly.  MSK/Ext: No clubbing or cyanosis.  Skin: Warm and dry, no rashes.  Neurological:  No focal deficits.  Congenitally deaf.  Psych: A&O x 3, appropriate affect, good judgement, sending appropriately with .      Return in about 4 weeks (around 11/29/2024).    Written instructions given today:      *Heart murmur means heart sound.  The murmur is very mild.  Echocardiogram will show us a baseline of how the heart valves " are doing so we can be sure that the murmur is benign and we have a baseline in case the murmur changes in the future.    -Lisinopril-HCTZ 10-12.5 mg is 1 pill with 2 medications.  Lisinopril 10 mg and hydrochlorothiazide 12.5 mg.  -I am concerned the lisinopril is causing you to cough.  I want to switch the lisinopril to losartan 25 mg once daily.  -Lisinopril 10 mg equals losartan 25 mg.  -If lisinopril is causing the cough, it should get better in 1 to 2 weeks.  -You also have a separate pill for HCTZ 12.5 mg    -If you feel anything abnormal in the chest again, we can have a heart monitor placed.  A Zio patch heart monitor goes on the chest like a giant Band-Aid and you can wear it for up to 2 weeks.  You can also shower but you cannot submerge into water such as a bathtub or swimming pool.  Call the office if you have heart palpitations or funny heartbeats and we can get this ordered.    *Patients with diabetes have a lower risk of heart attack and stroke when you take a class of medications known as the statins.  These are preferable over your fenofibrate.    -Stop your fenofibrate.  -Start atorvastatin 10 mg once daily.  Report back if you get achy muscles or joints while taking this medication.    It is my pleasure to participate in the care of Ms. Harris.  Please do not hesitate to contact me with questions or concerns.    Mellissa Kim MD, [facility]  Cardiologist, [facility]    Please note that this dictation was created using voice recognition software. I have made every reasonable attempt to correct obvious errors, but it is possible there are errors of grammar and possibly content that I did not discover before finalizing the note.      Latisha Hathaway, CANDIDO.P.R.N.  [address]  [place] [address]  Via In Basket

## 2024-11-01 NOTE — LETTER
November 1, 2024    To Whom It May Concern:         This is confirmation that Montserrat Kimberly attended her scheduled appointment with me on 11/1/2024.          If you have any questions please do not hesitate to call me at the phone number listed below.    Sincerely,          Mellissa Kim M.D.  [phone]

## 2024-12-03 ENCOUNTER — OFFICE VISIT (OUTPATIENT)
Dept: CARDIOLOGY | Facility: MEDICAL CENTER | Age: 59
End: 2024-12-03
Attending: PHYSICIAN ASSISTANT
Payer: COMMERCIAL

## 2024-12-03 VITALS
RESPIRATION RATE: 18 BRPM | HEIGHT: 63 IN | SYSTOLIC BLOOD PRESSURE: 120 MMHG | WEIGHT: 194 LBS | DIASTOLIC BLOOD PRESSURE: 74 MMHG | HEART RATE: 102 BPM | OXYGEN SATURATION: 94 % | BODY MASS INDEX: 34.38 KG/M2

## 2024-12-03 DIAGNOSIS — I10 PRIMARY HYPERTENSION: ICD-10-CM

## 2024-12-03 DIAGNOSIS — E11.9 CONTROLLED TYPE 2 DIABETES MELLITUS WITHOUT COMPLICATION, WITHOUT LONG-TERM CURRENT USE OF INSULIN (HCC): ICD-10-CM

## 2024-12-03 DIAGNOSIS — H91.3 DEAF, NONSPEAKING: ICD-10-CM

## 2024-12-03 DIAGNOSIS — N18.31 STAGE 3A CHRONIC KIDNEY DISEASE: ICD-10-CM

## 2024-12-03 DIAGNOSIS — R00.2 PALPITATIONS: ICD-10-CM

## 2024-12-03 DIAGNOSIS — E78.1 HYPERTRIGLYCERIDEMIA: ICD-10-CM

## 2024-12-03 PROCEDURE — 3074F SYST BP LT 130 MM HG: CPT | Performed by: PHYSICIAN ASSISTANT

## 2024-12-03 PROCEDURE — 99214 OFFICE O/P EST MOD 30 MIN: CPT | Performed by: PHYSICIAN ASSISTANT

## 2024-12-03 PROCEDURE — 3078F DIAST BP <80 MM HG: CPT | Performed by: PHYSICIAN ASSISTANT

## 2024-12-03 PROCEDURE — 99212 OFFICE O/P EST SF 10 MIN: CPT | Performed by: PHYSICIAN ASSISTANT

## 2024-12-03 ASSESSMENT — FIBROSIS 4 INDEX: FIB4 SCORE: 1.1

## 2024-12-03 ASSESSMENT — ENCOUNTER SYMPTOMS
PALPITATIONS: 0
SHORTNESS OF BREATH: 0
COUGH: 0
DIZZINESS: 0
WHEEZING: 0
PND: 0

## 2024-12-03 NOTE — PROGRESS NOTES
Chief Complaint   Patient presents with    Follow-Up     Palpitations      Hypertension     Primary hypertension     Dyslipidemia          Subjective:   Montserrat Harris is a 59 y.o. female who presents today for follow-up with her  Gallo.     Patient of Dr. Kim.  Initially seen 4 weeks ago, referred by her primary care for palpitations, shortness of breath.  Her other medical problems include hypertension, hyperlipidemia, type 2 diabetes, asthma.    Mrs. Harris communicates with sign language.  We have an  via Sabesim.     At last visit she was given reassurance regarding her palpitations.  An echocardiogram was ordered for cardiac murmur.  Her lisinopril was changed to losartan to alleviate cough and her fenofibrate was changed to atorvastatin given her type 2 diabetes.    Montserrat has done well with the medication changes, her cough has improve significantly. She is tolerating the statin as well. She has infrequent (rare) brief palpitations, last one she recalls was 2 months ago, lasting a few seconds which is what prompted the ER visit. Otherwise they are not bothersome.      Past Medical History:   Diagnosis Date    Allergy     Asthma     Fibroid     Hypertension     Migraine     Prediabetes          Family History   Problem Relation Age of Onset    Hypertension Mother     Cancer Father     Lung Cancer Father         asbestos    Obesity Sister     Diabetes Sister     Heart Failure Brother     No Known Problems Maternal Grandmother     No Known Problems Maternal Grandfather     No Known Problems Paternal Grandmother     No Known Problems Paternal Grandfather     No Known Problems Daughter     No Known Problems Son          Social History     Tobacco Use    Smoking status: Never    Smokeless tobacco: Never   Vaping Use    Vaping status: Never Used   Substance Use Topics    Alcohol use: Never    Drug use: Never         Allergies   Allergen Reactions    Lisinopril Cough  "        Current Outpatient Medications   Medication Sig    losartan (COZAAR) 25 MG Tab Take 1 Tablet by mouth every day.    hydrochlorothiazide (MICROZIDE) 12.5 MG capsule Take 1 Capsule by mouth every day.    atorvastatin (LIPITOR) 10 MG Tab Take 1 Tablet by mouth every evening.    metFORMIN (GLUCOPHAGE) 500 MG Tab Take 1 Tablet by mouth every day.    meloxicam (MOBIC) 7.5 MG Tab Take 1 Tablet by mouth every day.    albuterol 108 (90 Base) MCG/ACT Aero Soln inhalation aerosol Inhale 2 Puffs every four hours as needed for Shortness of Breath.         Review of Systems   Respiratory:  Negative for cough (improved), shortness of breath and wheezing.    Cardiovascular:  Negative for chest pain, palpitations, leg swelling and PND.   Neurological:  Negative for dizziness.          Objective:   /74 (BP Location: Left arm, Patient Position: Sitting, BP Cuff Size: Adult)   Pulse (!) 102   Resp 18   Ht 1.6 m (5' 3\")   Wt 88 kg (194 lb)   SpO2 94%  Body mass index is 34.37 kg/m².         Physical Exam  Vitals reviewed.   HENT:      Head: Normocephalic and atraumatic.   Cardiovascular:      Rate and Rhythm: Normal rate and regular rhythm.      Heart sounds: Murmur heard.   Pulmonary:      Effort: Pulmonary effort is normal. No respiratory distress.      Breath sounds: No rales.   Musculoskeletal:      Right lower leg: No edema.      Left lower leg: No edema.   Skin:     General: Skin is warm and dry.   Neurological:      General: No focal deficit present.      Mental Status: She is alert.      Gait: Gait normal.   Psychiatric:         Judgment: Judgment normal.            Assessment:     1. Palpitations        2. Primary hypertension        3. Deaf, nonspeaking        4. Controlled type 2 diabetes mellitus without complication, without long-term current use of insulin (HCC)        5. Stage 3a chronic kidney disease        6. Hypertriglyceridemia             Medical Decision Making:  Today's Assessment / Plan: "   Cardiac murmur  - scheduled for TTE in Jan    Hypertension, controlled  - cont ARB and HCTZ. Electrolyte panel to be drawn in a few weeks to assess from any drug-related derangements    Type 2 diabetes, hypertriglyceridemia  -Now tolerating moderate intensity statin, lipid profile due in a few weeks prior to her annual visit with PCP    Palpitations  - brief and infrequent, not occurring enough to catch on event monitor at this point    Return in about 1 year (around 12/3/2025) for Dr Kim, block 2 visits for language interpretation please.  Sooner if problems.

## 2024-12-03 NOTE — LETTER
December 3, 2024    To Whom It May Concern:         This is confirmation that Montserrat Ophelia Kimberly attended her scheduled appointment with Candida Tsang P.A.-C. on 12/03/24. Please excuse from work for the day.          If you have any questions please do not hesitate to call me at the phone number listed below.    Sincerely,          Candida Tsang P.A.-C.  [phone]

## 2024-12-23 DIAGNOSIS — R73.03 PRE-DIABETES: ICD-10-CM

## 2024-12-23 NOTE — TELEPHONE ENCOUNTER
Received request via: Pharmacy    Was the patient seen in the last year in this department? Yes    Does the patient have an active prescription (recently filled or refills available) for medication(s) requested? No    Pharmacy Name: [facility]    Does the patient have snf Plus and need 100-day supply? (This applies to ALL medications) Patient does not have SCP

## 2024-12-24 NOTE — TELEPHONE ENCOUNTER
Requested Prescriptions     Pending Prescriptions Disp Refills    metFORMIN (GLUCOPHAGE) 500 MG Tab 90 Tablet 3     Sig: Take 1 Tablet by mouth every day.       RIGO Durham.

## 2025-01-13 ENCOUNTER — APPOINTMENT (OUTPATIENT)
Dept: MEDICAL GROUP | Facility: PHYSICIAN GROUP | Age: 60
End: 2025-01-13
Payer: COMMERCIAL

## 2025-01-16 ENCOUNTER — HOSPITAL ENCOUNTER (OUTPATIENT)
Dept: LAB | Facility: MEDICAL CENTER | Age: 60
End: 2025-01-16
Attending: NURSE PRACTITIONER
Payer: COMMERCIAL

## 2025-01-16 DIAGNOSIS — E78.5 DYSLIPIDEMIA: ICD-10-CM

## 2025-01-16 DIAGNOSIS — I10 PRIMARY HYPERTENSION: ICD-10-CM

## 2025-01-16 DIAGNOSIS — Z00.00 ROUTINE HEALTH MAINTENANCE: ICD-10-CM

## 2025-01-16 DIAGNOSIS — R73.03 PRE-DIABETES: ICD-10-CM

## 2025-01-16 DIAGNOSIS — E66.9 OBESITY (BMI 30-39.9): ICD-10-CM

## 2025-01-16 LAB
BASOPHILS # BLD AUTO: 0.6 % (ref 0–1.8)
BASOPHILS # BLD: 0.05 K/UL (ref 0–0.12)
CREAT UR-MCNC: 93.79 MG/DL
EOSINOPHIL # BLD AUTO: 0.06 K/UL (ref 0–0.51)
EOSINOPHIL NFR BLD: 0.7 % (ref 0–6.9)
ERYTHROCYTE [DISTWIDTH] IN BLOOD BY AUTOMATED COUNT: 40 FL (ref 35.9–50)
EST. AVERAGE GLUCOSE BLD GHB EST-MCNC: 128 MG/DL
HBA1C MFR BLD: 6.1 % (ref 4–5.6)
HCT VFR BLD AUTO: 47.7 % (ref 37–47)
HGB BLD-MCNC: 15.5 G/DL (ref 12–16)
IMM GRANULOCYTES # BLD AUTO: 0.05 K/UL (ref 0–0.11)
IMM GRANULOCYTES NFR BLD AUTO: 0.6 % (ref 0–0.9)
LYMPHOCYTES # BLD AUTO: 0.97 K/UL (ref 1–4.8)
LYMPHOCYTES NFR BLD: 10.9 % (ref 22–41)
MCH RBC QN AUTO: 29.3 PG (ref 27–33)
MCHC RBC AUTO-ENTMCNC: 32.5 G/DL (ref 32.2–35.5)
MCV RBC AUTO: 90.2 FL (ref 81.4–97.8)
MICROALBUMIN UR-MCNC: 62.1 MG/DL
MICROALBUMIN/CREAT UR: 662 MG/G (ref 0–30)
MONOCYTES # BLD AUTO: 0.35 K/UL (ref 0–0.85)
MONOCYTES NFR BLD AUTO: 3.9 % (ref 0–13.4)
NEUTROPHILS # BLD AUTO: 7.45 K/UL (ref 1.82–7.42)
NEUTROPHILS NFR BLD: 83.3 % (ref 44–72)
NRBC # BLD AUTO: 0 K/UL
NRBC BLD-RTO: 0 /100 WBC (ref 0–0.2)
PLATELET # BLD AUTO: 201 K/UL (ref 164–446)
PMV BLD AUTO: 12.7 FL (ref 9–12.9)
RBC # BLD AUTO: 5.29 M/UL (ref 4.2–5.4)
WBC # BLD AUTO: 8.9 K/UL (ref 4.8–10.8)

## 2025-01-16 PROCEDURE — 82570 ASSAY OF URINE CREATININE: CPT

## 2025-01-16 PROCEDURE — 80053 COMPREHEN METABOLIC PANEL: CPT

## 2025-01-16 PROCEDURE — 80061 LIPID PANEL: CPT

## 2025-01-16 PROCEDURE — 82043 UR ALBUMIN QUANTITATIVE: CPT

## 2025-01-16 PROCEDURE — 85025 COMPLETE CBC W/AUTO DIFF WBC: CPT

## 2025-01-16 PROCEDURE — 84443 ASSAY THYROID STIM HORMONE: CPT

## 2025-01-16 PROCEDURE — 83036 HEMOGLOBIN GLYCOSYLATED A1C: CPT

## 2025-01-16 PROCEDURE — 36415 COLL VENOUS BLD VENIPUNCTURE: CPT

## 2025-01-17 ENCOUNTER — HOSPITAL ENCOUNTER (OUTPATIENT)
Dept: CARDIOLOGY | Facility: MEDICAL CENTER | Age: 60
End: 2025-01-17
Attending: INTERNAL MEDICINE
Payer: COMMERCIAL

## 2025-01-17 DIAGNOSIS — R01.1 UNDIAGNOSED CARDIAC MURMURS: ICD-10-CM

## 2025-01-17 LAB
ALBUMIN SERPL BCP-MCNC: 4.4 G/DL (ref 3.2–4.9)
ALBUMIN/GLOB SERPL: 1.4 G/DL
ALP SERPL-CCNC: 87 U/L (ref 30–99)
ALT SERPL-CCNC: 23 U/L (ref 2–50)
ANION GAP SERPL CALC-SCNC: 14 MMOL/L (ref 7–16)
AST SERPL-CCNC: 21 U/L (ref 12–45)
BILIRUB SERPL-MCNC: 0.5 MG/DL (ref 0.1–1.5)
BUN SERPL-MCNC: 17 MG/DL (ref 8–22)
CALCIUM ALBUM COR SERPL-MCNC: 9.6 MG/DL (ref 8.5–10.5)
CALCIUM SERPL-MCNC: 9.9 MG/DL (ref 8.5–10.5)
CHLORIDE SERPL-SCNC: 102 MMOL/L (ref 96–112)
CHOLEST SERPL-MCNC: 160 MG/DL (ref 100–199)
CO2 SERPL-SCNC: 26 MMOL/L (ref 20–33)
CREAT SERPL-MCNC: 0.83 MG/DL (ref 0.5–1.4)
FASTING STATUS PATIENT QL REPORTED: NORMAL
GFR SERPLBLD CREATININE-BSD FMLA CKD-EPI: 81 ML/MIN/1.73 M 2
GLOBULIN SER CALC-MCNC: 3.1 G/DL (ref 1.9–3.5)
GLUCOSE SERPL-MCNC: 144 MG/DL (ref 65–99)
HDLC SERPL-MCNC: 72 MG/DL
LDLC SERPL CALC-MCNC: 62 MG/DL
POTASSIUM SERPL-SCNC: 4.3 MMOL/L (ref 3.6–5.5)
PROT SERPL-MCNC: 7.5 G/DL (ref 6–8.2)
SODIUM SERPL-SCNC: 142 MMOL/L (ref 135–145)
TRIGL SERPL-MCNC: 132 MG/DL (ref 0–149)
TSH SERPL DL<=0.005 MIU/L-ACNC: 1.97 UIU/ML (ref 0.38–5.33)

## 2025-01-17 PROCEDURE — 700117 HCHG RX CONTRAST REV CODE 255: Performed by: INTERNAL MEDICINE

## 2025-01-17 PROCEDURE — 93306 TTE W/DOPPLER COMPLETE: CPT

## 2025-01-17 RX ADMIN — HUMAN ALBUMIN MICROSPHERES AND PERFLUTREN 3 ML: 10; .22 INJECTION, SOLUTION INTRAVENOUS at 11:45

## 2025-01-20 LAB — LV EJECT FRACT  99904: 70

## 2025-01-20 PROCEDURE — 93306 TTE W/DOPPLER COMPLETE: CPT | Mod: 26 | Performed by: INTERNAL MEDICINE

## 2025-03-25 SDOH — ECONOMIC STABILITY: INCOME INSECURITY: HOW HARD IS IT FOR YOU TO PAY FOR THE VERY BASICS LIKE FOOD, HOUSING, MEDICAL CARE, AND HEATING?: NOT HARD AT ALL

## 2025-03-25 SDOH — HEALTH STABILITY: PHYSICAL HEALTH

## 2025-03-25 SDOH — ECONOMIC STABILITY: INCOME INSECURITY: IN THE LAST 12 MONTHS, WAS THERE A TIME WHEN YOU WERE NOT ABLE TO PAY THE MORTGAGE OR RENT ON TIME?: NO

## 2025-03-25 SDOH — ECONOMIC STABILITY: FOOD INSECURITY: WITHIN THE PAST 12 MONTHS, YOU WORRIED THAT YOUR FOOD WOULD RUN OUT BEFORE YOU GOT MONEY TO BUY MORE.: NEVER TRUE

## 2025-03-25 SDOH — ECONOMIC STABILITY: FOOD INSECURITY: WITHIN THE PAST 12 MONTHS, THE FOOD YOU BOUGHT JUST DIDN'T LAST AND YOU DIDN'T HAVE MONEY TO GET MORE.: NEVER TRUE

## 2025-03-25 SDOH — ECONOMIC STABILITY: TRANSPORTATION INSECURITY
IN THE PAST 12 MONTHS, HAS THE LACK OF TRANSPORTATION KEPT YOU FROM MEDICAL APPOINTMENTS OR FROM GETTING MEDICATIONS?: NO

## 2025-03-25 SDOH — HEALTH STABILITY: MENTAL HEALTH

## 2025-03-25 ASSESSMENT — SOCIAL DETERMINANTS OF HEALTH (SDOH)
HOW OFTEN DO YOU GET TOGETHER WITH FRIENDS OR RELATIVES?: ONCE A WEEK
HOW OFTEN DO YOU ATTEND CHURCH OR RELIGIOUS SERVICES?: NEVER
HOW OFTEN DO YOU ATTENT MEETINGS OF THE CLUB OR ORGANIZATION YOU BELONG TO?: NEVER
IN THE PAST 12 MONTHS, HAS THE ELECTRIC, GAS, OIL, OR WATER COMPANY THREATENED TO SHUT OFF SERVICE IN YOUR HOME?: NO
IN A TYPICAL WEEK, HOW MANY TIMES DO YOU TALK ON THE PHONE WITH FAMILY, FRIENDS, OR NEIGHBORS?: MORE THAN THREE TIMES A WEEK
DO YOU BELONG TO ANY CLUBS OR ORGANIZATIONS SUCH AS CHURCH GROUPS UNIONS, FRATERNAL OR ATHLETIC GROUPS, OR SCHOOL GROUPS?: NO
HOW OFTEN DO YOU HAVE A DRINK CONTAINING ALCOHOL: NEVER
HOW OFTEN DO YOU ATTENT MEETINGS OF THE CLUB OR ORGANIZATION YOU BELONG TO?: NEVER
HOW HARD IS IT FOR YOU TO PAY FOR THE VERY BASICS LIKE FOOD, HOUSING, MEDICAL CARE, AND HEATING?: NOT HARD AT ALL
WITHIN THE PAST 12 MONTHS, YOU WORRIED THAT YOUR FOOD WOULD RUN OUT BEFORE YOU GOT THE MONEY TO BUY MORE: NEVER TRUE
HOW OFTEN DO YOU ATTEND CHURCH OR RELIGIOUS SERVICES?: NEVER
HOW OFTEN DO YOU GET TOGETHER WITH FRIENDS OR RELATIVES?: ONCE A WEEK
IN A TYPICAL WEEK, HOW MANY TIMES DO YOU TALK ON THE PHONE WITH FAMILY, FRIENDS, OR NEIGHBORS?: MORE THAN THREE TIMES A WEEK
HOW OFTEN DO YOU HAVE SIX OR MORE DRINKS ON ONE OCCASION: WEEKLY
DO YOU BELONG TO ANY CLUBS OR ORGANIZATIONS SUCH AS CHURCH GROUPS UNIONS, FRATERNAL OR ATHLETIC GROUPS, OR SCHOOL GROUPS?: NO
HOW MANY DRINKS CONTAINING ALCOHOL DO YOU HAVE ON A TYPICAL DAY WHEN YOU ARE DRINKING: PATIENT DOES NOT DRINK

## 2025-03-25 ASSESSMENT — LIFESTYLE VARIABLES
HOW MANY STANDARD DRINKS CONTAINING ALCOHOL DO YOU HAVE ON A TYPICAL DAY: PATIENT DOES NOT DRINK
SKIP TO QUESTIONS 9-10: 0
AUDIT-C TOTAL SCORE: 3
HOW OFTEN DO YOU HAVE A DRINK CONTAINING ALCOHOL: NEVER
HOW OFTEN DO YOU HAVE SIX OR MORE DRINKS ON ONE OCCASION: WEEKLY

## 2025-03-27 ENCOUNTER — OFFICE VISIT (OUTPATIENT)
Dept: MEDICAL GROUP | Facility: PHYSICIAN GROUP | Age: 60
End: 2025-03-27
Payer: COMMERCIAL

## 2025-03-27 VITALS
TEMPERATURE: 97.8 F | WEIGHT: 198.4 LBS | DIASTOLIC BLOOD PRESSURE: 78 MMHG | HEIGHT: 63 IN | HEART RATE: 84 BPM | SYSTOLIC BLOOD PRESSURE: 136 MMHG | BODY MASS INDEX: 35.15 KG/M2 | OXYGEN SATURATION: 97 %

## 2025-03-27 DIAGNOSIS — Z00.00 ROUTINE HEALTH MAINTENANCE: ICD-10-CM

## 2025-03-27 DIAGNOSIS — R80.9 CONTROLLED TYPE 2 DIABETES MELLITUS WITH MICROALBUMINURIA, WITHOUT LONG-TERM CURRENT USE OF INSULIN (HCC): ICD-10-CM

## 2025-03-27 DIAGNOSIS — E78.5 DYSLIPIDEMIA: ICD-10-CM

## 2025-03-27 DIAGNOSIS — E11.29 CONTROLLED TYPE 2 DIABETES MELLITUS WITH MICROALBUMINURIA, WITHOUT LONG-TERM CURRENT USE OF INSULIN (HCC): ICD-10-CM

## 2025-03-27 DIAGNOSIS — Z12.12 SCREENING FOR COLORECTAL CANCER: ICD-10-CM

## 2025-03-27 DIAGNOSIS — Z12.11 SCREENING FOR COLORECTAL CANCER: ICD-10-CM

## 2025-03-27 DIAGNOSIS — M76.62 ACHILLES TENDINITIS, LEFT LEG: ICD-10-CM

## 2025-03-27 DIAGNOSIS — Z12.31 ENCOUNTER FOR SCREENING MAMMOGRAM FOR BREAST CANCER: ICD-10-CM

## 2025-03-27 DIAGNOSIS — Z00.00 ENCOUNTER FOR WELL ADULT EXAM WITHOUT ABNORMAL FINDINGS: ICD-10-CM

## 2025-03-27 DIAGNOSIS — I10 PRIMARY HYPERTENSION: ICD-10-CM

## 2025-03-27 DIAGNOSIS — E66.9 OBESITY (BMI 30-39.9): ICD-10-CM

## 2025-03-27 PROBLEM — R73.03 PRE-DIABETES: Status: RESOLVED | Noted: 2023-01-04 | Resolved: 2025-03-27

## 2025-03-27 PROBLEM — E11.9 CONTROLLED TYPE 2 DIABETES MELLITUS WITHOUT COMPLICATION, WITHOUT LONG-TERM CURRENT USE OF INSULIN (HCC): Status: ACTIVE | Noted: 2025-03-27

## 2025-03-27 PROCEDURE — 3075F SYST BP GE 130 - 139MM HG: CPT | Performed by: NURSE PRACTITIONER

## 2025-03-27 PROCEDURE — 3078F DIAST BP <80 MM HG: CPT | Performed by: NURSE PRACTITIONER

## 2025-03-27 PROCEDURE — 99396 PREV VISIT EST AGE 40-64: CPT | Performed by: NURSE PRACTITIONER

## 2025-03-27 PROCEDURE — 92250 FUNDUS PHOTOGRAPHY W/I&R: CPT | Mod: TC | Performed by: NURSE PRACTITIONER

## 2025-03-27 RX ORDER — LOSARTAN POTASSIUM 50 MG/1
50 TABLET ORAL DAILY
Qty: 90 TABLET | Refills: 1 | Status: SHIPPED | OUTPATIENT
Start: 2025-03-27

## 2025-03-27 ASSESSMENT — FIBROSIS 4 INDEX: FIB4 SCORE: 1.31

## 2025-03-27 ASSESSMENT — PATIENT HEALTH QUESTIONNAIRE - PHQ9: CLINICAL INTERPRETATION OF PHQ2 SCORE: 0

## 2025-03-27 NOTE — PROGRESS NOTES
Subjective:   CC:   Chief Complaint   Patient presents with    Annual Exam     Rx refills    Lab Results     Verbal consent was acquired by the patient to use BeGo ambient listening note generation during this visit Yes    HPI:   Montsrerat Harris is a 60 y.o. female who presents for annual exam:    History of Present Illness  The patient presents for evaluation of hypertension, ankle pain, and health maintenance. She is accompanied by an .    She has been experiencing elevated blood pressure, the duration of which is uncertain. She monitors her blood pressure at home using a cuff but reports inconsistent readings. She occasionally experiences blurred vision, which she attributes to her current eyeglasses. She has an upcoming appointment for a new pair of glasses. She reports feeling stressed due to her mother's Alzheimer's disease and her son's lack of communication. She also mentions feeling hot while at work, despite using a fan. She reports no alcohol or drug use and feels safe in her relationship. She always wears a seatbelt in the car and uses sun protection when outdoors. She has not seen a dentist in approximately 3 years. She plans to get new glasses at her next eye doctor appointment. She ceased menstruating around the age of 48 or 49 and has completed menopause. She no longer experiences hot flashes and is able to sleep comfortably. She occasionally sweats but it is infrequent. She is unsure if she has ever used hormone replacement therapy. She performs self-breast exams in the shower. She experiences ear pain when sneezing but reports no difficulty swallowing. She reports no neck soreness, constipation, diarrhea, or blood in her stool. She is currently taking losartan for her blood pressure.    She reports left ankle pain, which has resulted in a slow gait. The pain has slightly improved but persists. She has completed her orthopedic consultations and was advised to perform  exercises, walking, and stretching. No procedures were performed on her ankle. She engages in stretching, rotation, and walking exercises at home in the evening. Her work involves significant walking. She takes medication for her ankle pain every night and is considering switching to morning administration.    She is due for a mammogram and Pap smear and is attempting to schedule these appointments. She recalls having a Cologuard test done previously. She is currently taking metformin once daily.    SOCIAL HISTORY  She does not use any drugs or alcohol.    FAMILY HISTORY  Her mother has Alzheimer's disease.    MEDICATIONS  Current: losartan, metformin, atorvastatin, meloxicam, hydrochlorothiazide      Anticipatory Guidance:  Cholesterol screenin2025   LDL controlled: 62  Diabetes screenin2025   A1c controlled: 6.1%   UALB/CR: 662  Retinal exam: 3/27/2025   Monofilament: 3/27/2025   Diet: Recommend more lean meats, fruits, vegetables, whole grains.   Exercise: Encourage regular exercise.   Substance abuse: No   Safe in relationship: Yes  Seatbelts, bike/motorcycle helmet: Yes  Sun protection: Recommended  Dentist: Due for follow up  Eye doctor: Due for follow up    Cancer Screening:  Colorectal cancer screenin2022  Cervical cancer screening: Overdue  Breast cancer screenin2023    Infectious Disease Screening/Immunizations:  STI screening: Declines  Chlamydia/Gonorrhea screening: Declines  Hep C screening: Completed  HIV screen: Completed  Practices safe sex: Yes    Immunizations:   Influenza: Completed   Tetanus: 2023   Hep B: Completed   Pneumonia: Completed  Shingrix: Recommended    Received HPV series: Aged out    Preventative Care Screening:   Osteoporosis Screening: NA, due at age 65  Tobacco Screening: Never smoker  AAA Screening: N/A    Patient's last menstrual period was 2013 (within months).  She has not utilized hormone replacement therapy.  Denies any menopausal  symptoms.  No significant bloating/fluid retention, pelvic pain, or dyspareunia. No abnormal vaginal discharge.   No breast tenderness, mass, nipple discharge or changes in size or contour.    OB History    Para Term  AB Living   4 2 2 0 2 2   SAB IAB Ectopic Molar Multiple Live Births   2 0 0 0 0 2     She  reports that she is not currently sexually active and has had partner(s) who are male.  She  has a past medical history of Allergy, Asthma, Diabetes mellitus (HCC), Fibroid, Hypertension, Migraine, and Prediabetes.  She  has a past surgical history that includes ankle arthroscopy (Right).    Family History   Problem Relation Age of Onset    Hypertension Mother     Alzheimer's Disease Mother     Cancer Father     Lung Cancer Father         asbestos    Obesity Sister     Diabetes Sister     Heart Failure Brother     No Known Problems Maternal Grandmother     No Known Problems Maternal Grandfather     No Known Problems Paternal Grandmother     No Known Problems Paternal Grandfather     No Known Problems Daughter     No Known Problems Son      Social History     Tobacco Use    Smoking status: Never    Smokeless tobacco: Never   Vaping Use    Vaping status: Never Used   Substance Use Topics    Alcohol use: Never    Drug use: Never     Patient Active Problem List    Diagnosis Date Noted    Controlled type 2 diabetes mellitus with microalbuminuria, without long-term current use of insulin ([facility]) 2025    Primary hypertension 2023    Achilles tendinitis, left leg 2023    Hot flashes 2023    Mixed stress and urge urinary incontinence 2023    Dyslipidemia 2023    Deaf, nonspeaking 2023    Mild intermittent asthma without complication 2023    Obesity (BMI 30-39.9) 2023     Current Outpatient Medications   Medication Sig Dispense Refill    losartan (COZAAR) 50 MG Tab Take 1 Tablet by mouth every day. 90 Tablet 1    metFORMIN (GLUCOPHAGE) 500 MG Tab Take 1  "Tablet by mouth every day. 90 Tablet 3    hydrochlorothiazide (MICROZIDE) 12.5 MG capsule Take 1 Capsule by mouth every day. 90 Capsule 3    atorvastatin (LIPITOR) 10 MG Tab Take 1 Tablet by mouth every evening. 90 Tablet 3    meloxicam (MOBIC) 7.5 MG Tab Take 1 Tablet by mouth every day. 90 Tablet 1    albuterol 108 (90 Base) MCG/ACT Aero Soln inhalation aerosol Inhale 2 Puffs every four hours as needed for Shortness of Breath. 1 Each 11     No current facility-administered medications for this visit.     Allergies   Allergen Reactions    Lisinopril Cough     Review of Systems   Constitutional: Negative for fever, chills and malaise/fatigue.   HENT: Negative for congestion.    Eyes: Negative for pain.   Respiratory: Negative for cough and shortness of breath.    Cardiovascular: Negative for chest pain and leg swelling.   Gastrointestinal: Negative for nausea, vomiting, abdominal pain and diarrhea.   Genitourinary: Negative for dysuria and hematuria.   Skin: Negative for rash.   Neurological: Negative for dizziness, focal weakness and headaches.   Endo/Heme/Allergies: Does not bruise/bleed easily.   Psychiatric/Behavioral: Negative for depression.  The patient is not nervous/anxious.      Objective:   /78 (BP Location: Left arm, Patient Position: Sitting, BP Cuff Size: Large adult)   Pulse 84   Temp 36.6 °C (97.8 °F) (Temporal)   Ht 1.6 m (5' 3\")   Wt 90 kg (198 lb 6.4 oz)   LMP 01/01/2013 (Within Months)   SpO2 97%   BMI 35.14 kg/m²     Wt Readings from Last 4 Encounters:   03/27/25 90 kg (198 lb 6.4 oz)   12/03/24 88 kg (194 lb)   11/01/24 86.2 kg (190 lb)   09/19/24 90 kg (198 lb 6.4 oz)     Physical Exam:  Constitutional: Well-developed and well-nourished. Not diaphoretic. No distress.   Skin: Skin is warm and dry. No rash noted.  Head: Atraumatic without lesions.  Eyes: Conjunctivae and extraocular motions are normal. Pupils are equal, round, and reactive to light. No scleral icterus.   Ears:  " External ears unremarkable. Tympanic membranes clear and intact.  Nose: Nares patent. Septum midline. Turbinates without erythema nor edema. No discharge.   Mouth/Throat: Tongue normal. Oropharynx is clear and moist. Posterior pharynx without erythema or exudates.  Neck: Supple, trachea midline. Normal range of motion. No thyromegaly present. No lymphadenopathy--cervical or supraclavicular.  Cardiovascular: Regular rate and rhythm, S1 and S2 without murmur, rubs, or gallops.    Respiratory: Effort normal. Clear to auscultation throughout. No adventitious sounds.   Breast:  Breast exam deferred. Discussed monthly self exams and what to look for, including peau d'orange or nipple retraction, discharge, breasts moving freely and equally without restriction, axillary/supraclavicular adenopathy, or palpable masses/nodules.  Abdomen: Soft, non tender, and without distention. Active bowel sounds in all four quadrants. No rebound, guarding.  Extremities: No cyanosis, clubbing, erythema, nor edema. Radial pulses intact and symmetric.   Musculoskeletal: All major joints AROM full in all directions without pain.  Neurological: Alert and oriented x 3. Grossly non-focal. Strength and sensation grossly intact.   Psychiatric:  Behavior, mood, and affect are appropriate.    Monofilament testing with a 10 gram force: sensation intact: intact bilaterally  Visual Inspection: Feet without maceration, ulcers, fissures.  Pedal pulses: intact bilaterally         3/27/2025    10:42 AM    SERVICES   Is patient using  services for this encounter? Yes   Language Interpreted Sign Lang    Name Yulissa [id number]    Mode iPad   Content of Interpretation (select all) History/Visit information;Patient Education;Consent;Orders;Discharge Instructions (AVS);Medications      Assessment and Plan:   1. Encounter for well adult exam without abnormal findings    2. Controlled type 2 diabetes mellitus with  microalbuminuria, without long-term current use of insulin (HCC)  Chronic, ongoing. Continue metformin 500 mg daily. A foot exam was conducted today. A retinal exam will be performed during this visit.   - POCT Retinal Eye Exam  - Diabetic Monofilament LE Exam    3. Primary hypertension  Chronic, uncontrolled.  Continue hydrochlorothiazide 12.5 mg daily.  Plan to increase losartan from 25 mg daily to 50 mg daily.  Encourage patient to monitor blood pressure regularly.  Follow-up in 4 months.  - losartan (COZAAR) 50 MG Tab; Take 1 Tablet by mouth every day.  Dispense: 90 Tablet; Refill: 1    4. Dyslipidemia  Chronic, stable.  Continue atorvastatin 10 mg nightly.    5. Obesity (BMI 30-39.9)  Chronic, ongoing.  Encourage diet high in fruits, vegetables, and fiber. And a diet low in salt, refined carbohydrates, cholesterol, saturated fat, and trans fatty acids.    Encourage a minimum of 30 minutes of moderate intensity aerobic exercise (eg, brisk walking) is recommended on five days each week. Or 30 minutes of vigorous-intensity aerobic exercise (eg, jogging) on three days each week.   Patient's body mass index is 35.14 kg/m². Exercise and nutrition counseling were performed at this visit.    6. Achilles tendinitis, left leg  Chronic, ongoing.  She reports persistent ankle pain, which has improved slightly but not resolved. She is advised to continue stretching exercises and consider taking her pain medication (meloxicam 7.5 mg daily) in the morning instead of at night to see if it provides better relief.    7. Screening for colorectal cancer  A Cologuard test will be ordered as she is due for it in May 2025.  - Cologuard Colon Cancer Screening (FIT DNA)    8. Encounter for screening mammogram for breast cancer  Due for screening.  - MA-SCREENING MAMMO BILAT W/TOMOSYNTHESIS W/CAD; Future    9. Routine health maintenance  Referral to gynecology for Pap smear.  - Referral to Gynecology      Health maintenance: Up to date    Labs per orders  Immunizations: Up to date  Patient counseled about skin care, diet, supplements, and exercise.  Discussed  breast self exam, mammography screening, menopause, osteoporosis, adequate intake of calcium and vitamin D, diet and exercise, colorectal cancer screening.     Follow-up: Return in about 4 months (around 7/27/2025) for Hypertension.     I have placed the below orders and discussed them with an approved delegating provider. The MA is performing the below orders under the direction of Dr. Allen.       Please note that this dictation was created using voice recognition software. I have worked with consultants from the vendor as well as technical experts from [employer] to optimize the interface. I have made every reasonable attempt to correct obvious errors, but I expect that there are errors of grammar and possibly content that I did not discover before finalizing the note.

## 2025-03-28 LAB — RETINAL SCREEN: NEGATIVE

## 2025-03-29 ENCOUNTER — RESULTS FOLLOW-UP (OUTPATIENT)
Dept: MEDICAL GROUP | Facility: PHYSICIAN GROUP | Age: 60
End: 2025-03-29

## 2025-03-31 NOTE — Clinical Note
REFERRAL APPROVAL NOTICE         Sent on March 31, 2025                   Montserrat Harris  [address] [address]                   Dear MsMichelle Harris,    After a careful review of the medical information and benefit coverage, [facility] has processed your referral. See below for additional details.    If applicable, you must be actively enrolled with your insurance for coverage of the authorized service. If you have any questions regarding your coverage, please contact your insurance directly.    REFERRAL INFORMATION   Referral #:  [id number]  Referred-To Department    Referred-By Provider:  Gynecology    GRACE Durham   [facility]      [address] [address] [phone] [address], [address]  [place] [address] [phone]    Referral Start Date:  03/27/2025  Referral End Date:   03/27/2026             SCHEDULING  If you do not already have an appointment, please call [phone] to make an appointment.     MORE INFORMATION  If you do not already have a [employer] account, sign up at: [email or web address]  You can access your medical information, make appointments, see lab results, billing information, and more.  If you have questions regarding this referral, please contact  the [facility] Referrals department at:             [phone]. Monday - Friday 8:00AM - 5:00PM.     Sincerely,    [facility]

## 2025-04-16 DIAGNOSIS — M76.62 ACHILLES TENDINITIS, LEFT LEG: ICD-10-CM

## 2025-04-17 RX ORDER — MELOXICAM 7.5 MG/1
7.5 TABLET ORAL DAILY
Qty: 90 TABLET | Refills: 1 | Status: SHIPPED | OUTPATIENT
Start: 2025-04-17

## 2025-04-17 NOTE — TELEPHONE ENCOUNTER
Received request via: Patient    Was the patient seen in the last year in this department?     Does the patient have an active prescription (recently filled or refills available) for medication(s) requested?     Pharmacy Name:     Does the patient have jail Plus and need 100-day supply? (This applies to ALL medications)   
Requested Prescriptions     Pending Prescriptions Disp Refills    meloxicam (MOBIC) 7.5 MG Tab 90 Tablet 1     Sig: Take 1 Tablet by mouth every day.       RIGO Durham.   
[phone]

## 2025-05-16 ENCOUNTER — APPOINTMENT (OUTPATIENT)
Dept: RADIOLOGY | Facility: MEDICAL CENTER | Age: 60
End: 2025-05-16
Attending: NURSE PRACTITIONER
Payer: COMMERCIAL

## 2025-07-01 ENCOUNTER — HOSPITAL ENCOUNTER (OUTPATIENT)
Dept: RADIOLOGY | Facility: MEDICAL CENTER | Age: 60
End: 2025-07-01
Attending: NURSE PRACTITIONER
Payer: COMMERCIAL

## 2025-07-01 DIAGNOSIS — Z12.31 ENCOUNTER FOR SCREENING MAMMOGRAM FOR BREAST CANCER: ICD-10-CM

## 2025-07-01 PROCEDURE — 77063 BREAST TOMOSYNTHESIS BI: CPT | Performed by: STUDENT IN AN ORGANIZED HEALTH CARE EDUCATION/TRAINING PROGRAM

## 2025-07-01 PROCEDURE — 77063 BREAST TOMOSYNTHESIS BI: CPT

## 2025-07-03 ENCOUNTER — TELEPHONE (OUTPATIENT)
Dept: MEDICAL GROUP | Facility: PHYSICIAN GROUP | Age: 60
End: 2025-07-03
Payer: COMMERCIAL

## 2025-07-03 NOTE — TELEPHONE ENCOUNTER
Phone Number Called: [phone]    Call outcome: Spoke to patient regarding message below.    Message: Spoke with the patient's  regarding her abnormal mammogram results. The patient is seeking clarification on the next steps and reports that she was advised to undergo a breast ultrasound.

## 2025-07-29 ENCOUNTER — OFFICE VISIT (OUTPATIENT)
Dept: MEDICAL GROUP | Facility: PHYSICIAN GROUP | Age: 60
End: 2025-07-29
Payer: COMMERCIAL

## 2025-07-29 VITALS
HEART RATE: 91 BPM | SYSTOLIC BLOOD PRESSURE: 116 MMHG | OXYGEN SATURATION: 96 % | WEIGHT: 200.3 LBS | DIASTOLIC BLOOD PRESSURE: 70 MMHG | TEMPERATURE: 97.3 F | HEIGHT: 63 IN | BODY MASS INDEX: 35.49 KG/M2

## 2025-07-29 DIAGNOSIS — R22.32 LOCALIZED SWELLING ON LEFT HAND: ICD-10-CM

## 2025-07-29 DIAGNOSIS — E11.29 CONTROLLED TYPE 2 DIABETES MELLITUS WITH MICROALBUMINURIA, WITHOUT LONG-TERM CURRENT USE OF INSULIN (HCC): ICD-10-CM

## 2025-07-29 DIAGNOSIS — R80.9 CONTROLLED TYPE 2 DIABETES MELLITUS WITH MICROALBUMINURIA, WITHOUT LONG-TERM CURRENT USE OF INSULIN (HCC): ICD-10-CM

## 2025-07-29 DIAGNOSIS — Z12.12 SCREENING FOR COLORECTAL CANCER: ICD-10-CM

## 2025-07-29 DIAGNOSIS — I10 PRIMARY HYPERTENSION: ICD-10-CM

## 2025-07-29 DIAGNOSIS — Z12.11 SCREENING FOR COLORECTAL CANCER: ICD-10-CM

## 2025-07-29 DIAGNOSIS — Z00.00 ROUTINE HEALTH MAINTENANCE: ICD-10-CM

## 2025-07-29 LAB
HBA1C MFR BLD: 6.3 % (ref ?–5.8)
POCT INT CON NEG: NEGATIVE
POCT INT CON POS: POSITIVE

## 2025-07-29 ASSESSMENT — FIBROSIS 4 INDEX: FIB4 SCORE: 1.31

## 2025-07-29 NOTE — PROGRESS NOTES
"Verbal consent was acquired by the patient to use [employer] ambient listening note generation during this visit Yes      Subjective   Montserrat Harris is a 60 y.o. female who presents for:  History of Present Illness  The patient presents for evaluation of hypertension, diabetes, and left hand swelling.    She reports a significant improvement in her blood pressure, with home readings consistently around 116/70. She is pleased with her progress and wishes to continue her current medication regimen, which she tolerates well without any side effects such as dizziness or lightheadedness. Her sleep quality has also improved. She is scheduled to see a cardiologist on 12/03/2025.    She has not yet received her Cologuard test kit, which was supposed to be sent in May 2025. She plans to have a Pap smear next month and an ultrasound of her right breast, although she is unsure of the reason for the latter.     She noticed swelling in the joints of her left hand and fingers last Saturday while at work, but it is not causing her any pain. 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 She is also considering the possibility of an allergic reaction.    Her A1c was 6.3 up from 6.1 last time.    She has asthma and uses an inhaler if she has trouble breathing.    Sleep: Reports good sleep quality    ROS:  See HPI    Objective   /70 (BP Location: Right arm, Patient Position: Sitting, BP Cuff Size: Adult)   Pulse 91   Temp 36.3 °C (97.3 °F) (Temporal)   Ht 1.6 m (5' 3\")   Wt 90.9 kg (200 lb 4.8 oz)   SpO2 96%   Physical Exam  General: Well nourished, well developed female in NAD, awake and conversant.  Eyes: Normal conjunctiva, anicteric.  Round symmetrical pupils.  ENT: Hearing grossly intact.  No nasal discharge.  Neck: Neck is supple.  No masses or thyromegaly.  CV: No lower extremity edema.  Respiratory: Respirations are nonlabored.  No " wheezing.  Abdomen: Non-Distended.  Skin: Warm.  No rashes or ulcers.  MSK: Normal ambulation.  No clubbing or cyanosis. Swelling noted in the joints of the left hand  Neuro: Sensation and CN II-XII grossly normal.  Psych: Alert and oriented.  Cooperative, appropriate mood and affect, normal judgment.          7/29/2025    11:47 AM    SERVICES   Is patient using  services for this encounter? Yes   Language Interpreted Sign Lang    Name Jaleesa [id number]    Mode iPad   Content of Interpretation (select all) History/Visit information;Other;Patient Education;Consent;Orders;Discharge Instructions (AVS);Medications        Results  Labs   - A1c: 6.3 up from 6.1 last time    Assessment & Plan  1. Controlled type 2 diabetes mellitus with microalbuminuria, without long-term current use of insulin (HCC)  Chronic, ongoing.  A1c has increased from 6.1 to 6.3. The patient was advised to remain positive and continue the current management plan to bring A1c down. The patient is encouraged to monitor blood glucose levels regularly.  Continue metformin 500 mg daily. Due for updated annual labs prior to annual follow-up in March 2026.   - POCT A1C  - HEMOGLOBIN A1C; Future  - Comp Metabolic Panel; Future  - Lipid Profile; Future  - MICROALBUMIN CREAT RATIO URINE; Future    2. Primary hypertension  Chronic, ongoing.  Blood pressure readings at home have been 116/70, indicating good control. In-office blood pressure was 122/70. The patient reports no side effects from current medication and wishes to continue it.  Continue hydrochlorothiazide 12.5 mg daily and losartan 50 mg daily. Due for updated annual labs prior to annual follow-up in March 2026.   - CBC WITH DIFFERENTIAL; Future  - Comp Metabolic Panel; Future  - MICROALBUMIN CREAT RATIO URINE; Future  - TSH WITH REFLEX TO FT4; Future    3. Localized swelling on left hand  New to examiner.  Swelling in the left hand is likely due to arthritis.  The patient was advised to keep hands moving to manage symptoms. It was discussed that weather changes can sometimes exacerbate arthritis symptoms. No pain was reported, but the patient will monitor for any changes.  Recommend over-the-counter diclofenac gel as needed.    4. Screening for colorectal cancer  Due for screening.  - Cologuard Colon Cancer Screening (FIT DNA)    5. Routine health maintenance  Due for updated annual labs prior to annual follow-up in March 2026. A referral to a gynecologist will be made for a Pap smear.  - Referral to Gynecology  - HEMOGLOBIN A1C; Future  - CBC WITH DIFFERENTIAL; Future  - Comp Metabolic Panel; Future  - Lipid Profile; Future  - MICROALBUMIN CREAT RATIO URINE; Future  - TSH WITH REFLEX TO FT4; Future     Return in about 8 months (around 3/27/2026) for Preventative Annual, Follow up Labs.      I have placed the below orders and discussed them with an approved delegating provider. The MA is performing the below orders under the direction of Dr. Allen.      Please note that this dictation was created using voice recognition software. I have made every reasonable attempt to correct obvious errors, but I expect that there are errors of grammar and possibly content that I did not discover before finalizing the note.

## 2025-08-05 ENCOUNTER — HOSPITAL ENCOUNTER (OUTPATIENT)
Facility: MEDICAL CENTER | Age: 60
End: 2025-08-05
Attending: NURSE PRACTITIONER
Payer: COMMERCIAL

## 2025-08-05 DIAGNOSIS — R92.8 ABNORMAL MAMMOGRAM: ICD-10-CM

## 2025-08-05 PROCEDURE — 76642 ULTRASOUND BREAST LIMITED: CPT | Mod: RT

## 2025-08-12 DIAGNOSIS — J45.21 MILD INTERMITTENT ASTHMA WITH EXACERBATION: ICD-10-CM

## 2025-08-12 RX ORDER — ALBUTEROL SULFATE 90 UG/1
2 INHALANT RESPIRATORY (INHALATION) EVERY 4 HOURS PRN
Qty: 1 EACH | Refills: 11 | Status: SHIPPED | OUTPATIENT
Start: 2025-08-12

## 2025-08-26 ENCOUNTER — HOSPITAL ENCOUNTER (OUTPATIENT)
Facility: MEDICAL CENTER | Age: 60
End: 2025-08-26
Attending: NURSE PRACTITIONER
Payer: COMMERCIAL

## 2025-08-26 DIAGNOSIS — R92.8 ABNORMAL FINDING ON BREAST IMAGING: ICD-10-CM

## 2025-08-26 PROCEDURE — 19083 BX BREAST 1ST LESION US IMAG: CPT | Mod: RT

## 2025-08-26 PROCEDURE — 77065 DX MAMMO INCL CAD UNI: CPT

## 2025-08-26 PROCEDURE — 88305 TISSUE EXAM BY PATHOLOGIST: CPT | Mod: 26 | Performed by: PATHOLOGY

## 2025-08-26 PROCEDURE — 88305 TISSUE EXAM BY PATHOLOGIST: CPT | Performed by: PATHOLOGY

## 2025-08-27 LAB — PATHOLOGY CONSULT NOTE: NORMAL

## 2025-08-28 ENCOUNTER — TELEPHONE (OUTPATIENT)
Facility: MEDICAL CENTER | Age: 60
End: 2025-08-28
Payer: COMMERCIAL